# Patient Record
Sex: FEMALE
[De-identification: names, ages, dates, MRNs, and addresses within clinical notes are randomized per-mention and may not be internally consistent; named-entity substitution may affect disease eponyms.]

---

## 2020-08-26 ENCOUNTER — HOSPITAL ENCOUNTER (OUTPATIENT)
Dept: HOSPITAL 95 - LAB SHORT | Age: 70
Discharge: HOME | End: 2020-08-26
Attending: FAMILY MEDICINE
Payer: MEDICARE

## 2020-08-26 DIAGNOSIS — R42: Primary | ICD-10-CM

## 2020-08-26 LAB
ANION GAP SERPL CALCULATED.4IONS-SCNC: 13 MMOL/L (ref 6–16)
BASOPHILS # BLD AUTO: 0.02 K/MM3 (ref 0–0.23)
BASOPHILS NFR BLD AUTO: 0 % (ref 0–2)
BUN SERPL-MCNC: 25 MG/DL (ref 8–24)
CALCIUM SERPL-MCNC: 10.1 MG/DL (ref 8.5–10.1)
CHLORIDE SERPL-SCNC: 98 MMOL/L (ref 98–108)
CO2 SERPL-SCNC: 27 MMOL/L (ref 21–32)
CREAT SERPL-MCNC: 0.96 MG/DL (ref 0.4–1)
DEPRECATED RDW RBC AUTO: 46.5 FL (ref 35.1–46.3)
EOSINOPHIL # BLD AUTO: 0.02 K/MM3 (ref 0–0.68)
EOSINOPHIL NFR BLD AUTO: 0 % (ref 0–6)
ERYTHROCYTE [DISTWIDTH] IN BLOOD BY AUTOMATED COUNT: 12.4 % (ref 11.7–14.2)
GLUCOSE SERPL-MCNC: 119 MG/DL (ref 70–99)
HCT VFR BLD AUTO: 41 % (ref 33–51)
HGB BLD-MCNC: 14.4 G/DL (ref 11.5–16)
IMM GRANULOCYTES # BLD AUTO: 0.02 K/MM3 (ref 0–0.1)
IMM GRANULOCYTES NFR BLD AUTO: 0 % (ref 0–1)
LYMPHOCYTES # BLD AUTO: 0.72 K/MM3 (ref 0.84–5.2)
LYMPHOCYTES NFR BLD AUTO: 7 % (ref 21–46)
MCHC RBC AUTO-ENTMCNC: 35.1 G/DL (ref 31.5–36.5)
MCV RBC AUTO: 102 FL (ref 80–100)
MONOCYTES # BLD AUTO: 0.8 K/MM3 (ref 0.16–1.47)
MONOCYTES NFR BLD AUTO: 8 % (ref 4–13)
NEUTROPHILS # BLD AUTO: 8.23 K/MM3 (ref 1.96–9.15)
NEUTROPHILS NFR BLD AUTO: 84 % (ref 41–73)
NRBC # BLD AUTO: 0 K/MM3 (ref 0–0.02)
NRBC BLD AUTO-RTO: 0 /100 WBC (ref 0–0.2)
PLATELET # BLD AUTO: 211 K/MM3 (ref 150–400)
POTASSIUM SERPL-SCNC: 3.1 MMOL/L (ref 3.5–5.5)
SODIUM SERPL-SCNC: 138 MMOL/L (ref 136–145)

## 2020-09-12 ENCOUNTER — HOSPITAL ENCOUNTER (INPATIENT)
Dept: HOSPITAL 95 - ER | Age: 70
LOS: 2 days | Discharge: HOME | DRG: 439 | End: 2020-09-14
Attending: INTERNAL MEDICINE | Admitting: HOSPITALIST
Payer: MEDICARE

## 2020-09-12 VITALS — BODY MASS INDEX: 28.23 KG/M2 | HEIGHT: 64.02 IN | WEIGHT: 165.35 LBS

## 2020-09-12 DIAGNOSIS — E87.1: ICD-10-CM

## 2020-09-12 DIAGNOSIS — F32.9: ICD-10-CM

## 2020-09-12 DIAGNOSIS — K21.9: ICD-10-CM

## 2020-09-12 DIAGNOSIS — K59.00: ICD-10-CM

## 2020-09-12 DIAGNOSIS — E03.9: ICD-10-CM

## 2020-09-12 DIAGNOSIS — K85.90: Primary | ICD-10-CM

## 2020-09-12 DIAGNOSIS — E87.6: ICD-10-CM

## 2020-09-12 DIAGNOSIS — I10: ICD-10-CM

## 2020-09-12 DIAGNOSIS — Z79.82: ICD-10-CM

## 2020-09-12 DIAGNOSIS — R65.10: ICD-10-CM

## 2020-09-12 DIAGNOSIS — K86.9: ICD-10-CM

## 2020-09-12 LAB
SP GR SPEC: 1.01 (ref 1–1.02)
UROBILINOGEN UR STRIP-MCNC: (no result) MG/DL

## 2020-09-12 PROCEDURE — A9579 GAD-BASE MR CONTRAST NOS,1ML: HCPCS

## 2020-09-12 PROCEDURE — A9270 NON-COVERED ITEM OR SERVICE: HCPCS

## 2020-09-13 LAB
ALBUMIN SERPL BCP-MCNC: 3.5 G/DL (ref 3.4–5)
ANION GAP SERPL CALCULATED.4IONS-SCNC: 4 MMOL/L (ref 6–16)
BUN SERPL-MCNC: 16 MG/DL (ref 8–24)
CALCIUM SERPL-MCNC: 9.3 MG/DL (ref 8.5–10.1)
CHLORIDE SERPL-SCNC: 108 MMOL/L (ref 98–108)
CO2 SERPL-SCNC: 29 MMOL/L (ref 21–32)
CREAT SERPL-MCNC: 0.79 MG/DL (ref 0.4–1)
GLUCOSE SERPL-MCNC: 99 MG/DL (ref 70–99)
PHOSPHATE SERPL-MCNC: 2.8 MG/DL (ref 2.5–4.9)
POTASSIUM SERPL-SCNC: 3.4 MMOL/L (ref 3.5–5.5)
SODIUM SERPL-SCNC: 141 MMOL/L (ref 136–145)

## 2020-09-13 NOTE — NUR
SHIFT SUMMARY:
ADMITTED TO MEDICAL FLOOR FROM ER [facility]. VSS. AFEB. AAOX4. ABLE TO
COMMUNICATE NEEDS. STATES ABD PAIN IS WELL CONTROLLED AT THIS TIME. ABD SOFT,
MILDLY DISTENDED, NON-TENDER W/PALPATION. PT REPORTS NO BM X 1 WEEK. DENIES
FLATUS. NO N/V. UP INDEPENDENTLY IN ROOM. WILL CONT TO MONITOR.

## 2020-09-13 NOTE — NUR
PT IS A/OX3, PLEASANT AND COOPERATIVE, THE PT IS UP IND IN HER ROOM, THE PT
HAS DENIED N/V T/O THE DAY, THE PT WAS MEDICATED FOR HA PAIN X1 TODAY, PT
DENIED ABD PAIN, THE PT HAS TOLERATED CLEAR LIQ DIET, THE PT REPORTS DIARRHEA
AT THIS TIME, PT APPEARS TO BE BREATHING EASILY ON RA, CALL LIGHT IN REACH, NO
OTHER CHANGES NOTICED THIS SHIFT

## 2020-09-14 LAB
ALBUMIN SERPL BCP-MCNC: 3.2 G/DL (ref 3.4–5)
ALBUMIN/GLOB SERPL: 1.1 {RATIO} (ref 0.8–1.8)
ALT SERPL W P-5'-P-CCNC: 38 U/L (ref 12–78)
ANION GAP SERPL CALCULATED.4IONS-SCNC: 5 MMOL/L (ref 6–16)
AST SERPL W P-5'-P-CCNC: 21 U/L (ref 12–37)
BASOPHILS # BLD AUTO: 0.02 K/MM3 (ref 0–0.23)
BASOPHILS NFR BLD AUTO: 0 % (ref 0–2)
BILIRUB SERPL-MCNC: 0.3 MG/DL (ref 0.1–1)
BUN SERPL-MCNC: 9 MG/DL (ref 8–24)
CALCIUM SERPL-MCNC: 9 MG/DL (ref 8.5–10.1)
CHLORIDE SERPL-SCNC: 114 MMOL/L (ref 98–108)
CO2 SERPL-SCNC: 25 MMOL/L (ref 21–32)
CREAT SERPL-MCNC: 0.59 MG/DL (ref 0.4–1)
DEPRECATED RDW RBC AUTO: 47.9 FL (ref 35.1–46.3)
EOSINOPHIL # BLD AUTO: 0.1 K/MM3 (ref 0–0.68)
EOSINOPHIL NFR BLD AUTO: 2 % (ref 0–6)
ERYTHROCYTE [DISTWIDTH] IN BLOOD BY AUTOMATED COUNT: 12.3 % (ref 11.7–14.2)
GLOBULIN SER CALC-MCNC: 2.9 G/DL (ref 2.2–4)
GLUCOSE SERPL-MCNC: 103 MG/DL (ref 70–99)
HCT VFR BLD AUTO: 34.6 % (ref 33–51)
HGB BLD-MCNC: 11.5 G/DL (ref 11.5–16)
IMM GRANULOCYTES # BLD AUTO: 0.01 K/MM3 (ref 0–0.1)
IMM GRANULOCYTES NFR BLD AUTO: 0 % (ref 0–1)
LYMPHOCYTES # BLD AUTO: 1.03 K/MM3 (ref 0.84–5.2)
LYMPHOCYTES NFR BLD AUTO: 20 % (ref 21–46)
MCHC RBC AUTO-ENTMCNC: 33.2 G/DL (ref 31.5–36.5)
MCV RBC AUTO: 106 FL (ref 80–100)
MONOCYTES # BLD AUTO: 0.46 K/MM3 (ref 0.16–1.47)
MONOCYTES NFR BLD AUTO: 9 % (ref 4–13)
NEUTROPHILS # BLD AUTO: 3.45 K/MM3 (ref 1.96–9.15)
NEUTROPHILS NFR BLD AUTO: 68 % (ref 41–73)
NRBC # BLD AUTO: 0 K/MM3 (ref 0–0.02)
NRBC BLD AUTO-RTO: 0 /100 WBC (ref 0–0.2)
PLATELET # BLD AUTO: 172 K/MM3 (ref 150–400)
POTASSIUM SERPL-SCNC: 3.5 MMOL/L (ref 3.5–5.5)
PROT SERPL-MCNC: 6.1 G/DL (ref 6.4–8.2)
SODIUM SERPL-SCNC: 144 MMOL/L (ref 136–145)

## 2020-09-14 NOTE — NUR
NIGHT SHIFT SUMMARY
Patient states much more comfortable overnight.  Took Tylenol in AM for
headache, however no abd pain overnight.  OOB several times overnight to
void in toilet.  Patient is drinking well and hoping to be able to go home
today. multiple stools yesterday afternoon after laxatives given in morning.

## 2020-09-14 NOTE — NUR
SHIFT SUMMARY.
1535 PT DISCHARGED HOME VIA PERSONAL VEHICLE DRIVEN BY . ESCORTED TO
ENTRANCE VIA W/C BY VOLUNTEER. IV REMOVED. D/C INSTRUCTIONS REVIEWED WITH PT
AND COPY PROVIDED. NO NEW RX. NO NEW CHANGES OR CONCERNS.

## 2021-03-24 ENCOUNTER — HOSPITAL ENCOUNTER (OUTPATIENT)
Dept: HOSPITAL 95 - LAB | Age: 71
Discharge: HOME | End: 2021-03-24
Attending: INTERNAL MEDICINE
Payer: MEDICARE

## 2021-03-24 DIAGNOSIS — C25.0: Primary | ICD-10-CM

## 2021-03-24 LAB
ALBUMIN SERPL BCP-MCNC: 3.7 G/DL (ref 3.4–5)
ALBUMIN/GLOB SERPL: 1.3 {RATIO} (ref 0.8–1.8)
ALT SERPL W P-5'-P-CCNC: 17 U/L (ref 12–78)
ANION GAP SERPL CALCULATED.4IONS-SCNC: 8 MMOL/L (ref 6–16)
AST SERPL W P-5'-P-CCNC: 16 U/L (ref 12–37)
BASOPHILS # BLD AUTO: 0.05 K/MM3 (ref 0–0.23)
BASOPHILS NFR BLD AUTO: 1 % (ref 0–2)
BILIRUB SERPL-MCNC: 0.6 MG/DL (ref 0.1–1)
BUN SERPL-MCNC: 22 MG/DL (ref 8–24)
CALCIUM SERPL-MCNC: 9.1 MG/DL (ref 8.5–10.1)
CHLORIDE SERPL-SCNC: 105 MMOL/L (ref 98–108)
CO2 SERPL-SCNC: 25 MMOL/L (ref 21–32)
CREAT SERPL-MCNC: 0.59 MG/DL (ref 0.4–1)
DEPRECATED RDW RBC AUTO: 73.3 FL (ref 35.1–46.3)
EOSINOPHIL # BLD AUTO: 0.11 K/MM3 (ref 0–0.68)
EOSINOPHIL NFR BLD AUTO: 2 % (ref 0–6)
ERYTHROCYTE [DISTWIDTH] IN BLOOD BY AUTOMATED COUNT: 19 % (ref 11.7–14.2)
GLOBULIN SER CALC-MCNC: 2.8 G/DL (ref 2.2–4)
GLUCOSE SERPL-MCNC: 251 MG/DL (ref 70–99)
HCT VFR BLD AUTO: 33.4 % (ref 33–51)
HGB BLD-MCNC: 11.3 G/DL (ref 11.5–16)
IMM GRANULOCYTES # BLD AUTO: 0.02 K/MM3 (ref 0–0.1)
IMM GRANULOCYTES NFR BLD AUTO: 0 % (ref 0–1)
LYMPHOCYTES # BLD AUTO: 1.37 K/MM3 (ref 0.84–5.2)
LYMPHOCYTES NFR BLD AUTO: 24 % (ref 21–46)
MCHC RBC AUTO-ENTMCNC: 33.8 G/DL (ref 31.5–36.5)
MCV RBC AUTO: 105 FL (ref 80–100)
MONOCYTES # BLD AUTO: 0.91 K/MM3 (ref 0.16–1.47)
MONOCYTES NFR BLD AUTO: 16 % (ref 4–13)
NEUTROPHILS # BLD AUTO: 3.16 K/MM3 (ref 1.96–9.15)
NEUTROPHILS NFR BLD AUTO: 56 % (ref 41–73)
NRBC # BLD AUTO: 0 K/MM3 (ref 0–0.02)
NRBC BLD AUTO-RTO: 0 /100 WBC (ref 0–0.2)
PLATELET # BLD AUTO: 218 K/MM3 (ref 150–400)
POTASSIUM SERPL-SCNC: 4.3 MMOL/L (ref 3.5–5.5)
PROT SERPL-MCNC: 6.5 G/DL (ref 6.4–8.2)
SODIUM SERPL-SCNC: 138 MMOL/L (ref 136–145)

## 2021-08-28 ENCOUNTER — HOSPITAL ENCOUNTER (EMERGENCY)
Dept: HOSPITAL 95 - ER | Age: 71
Discharge: HOME | End: 2021-08-28
Payer: MEDICARE

## 2021-08-28 VITALS — WEIGHT: 141.01 LBS | BODY MASS INDEX: 24.07 KG/M2 | HEIGHT: 64 IN

## 2021-08-28 DIAGNOSIS — Z79.4: ICD-10-CM

## 2021-08-28 DIAGNOSIS — Z79.899: ICD-10-CM

## 2021-08-28 DIAGNOSIS — M10.9: Primary | ICD-10-CM

## 2021-08-28 DIAGNOSIS — Z88.5: ICD-10-CM

## 2021-08-28 DIAGNOSIS — I50.9: ICD-10-CM

## 2021-08-28 DIAGNOSIS — Z79.82: ICD-10-CM

## 2021-08-28 DIAGNOSIS — I11.0: ICD-10-CM

## 2021-08-28 DIAGNOSIS — E11.9: ICD-10-CM

## 2021-08-28 DIAGNOSIS — Z79.890: ICD-10-CM

## 2021-08-28 PROCEDURE — A9270 NON-COVERED ITEM OR SERVICE: HCPCS

## 2021-12-06 ENCOUNTER — HOSPITAL ENCOUNTER (OUTPATIENT)
Dept: HOSPITAL 95 - ORSCMMR | Age: 71
Discharge: HOME | End: 2021-12-06
Attending: STUDENT IN AN ORGANIZED HEALTH CARE EDUCATION/TRAINING PROGRAM
Payer: MEDICARE

## 2021-12-06 VITALS — BODY MASS INDEX: 22.21 KG/M2 | HEIGHT: 64 IN | WEIGHT: 130.07 LBS

## 2021-12-06 DIAGNOSIS — Z79.899: ICD-10-CM

## 2021-12-06 DIAGNOSIS — C25.9: Primary | ICD-10-CM

## 2021-12-06 DIAGNOSIS — K21.9: ICD-10-CM

## 2021-12-06 DIAGNOSIS — E11.9: ICD-10-CM

## 2021-12-06 DIAGNOSIS — I10: ICD-10-CM

## 2021-12-06 DIAGNOSIS — N18.30: ICD-10-CM

## 2021-12-06 PROCEDURE — C1788 PORT, INDWELLING, IMP: HCPCS

## 2022-06-07 ENCOUNTER — HOSPITAL ENCOUNTER (EMERGENCY)
Dept: HOSPITAL 95 - ER | Age: 72
Discharge: HOME | End: 2022-06-07
Payer: MEDICARE

## 2022-06-07 VITALS — BODY MASS INDEX: 20.49 KG/M2 | WEIGHT: 120 LBS | HEIGHT: 64 IN

## 2022-06-07 DIAGNOSIS — E11.649: Primary | ICD-10-CM

## 2022-06-07 DIAGNOSIS — I50.9: ICD-10-CM

## 2022-06-07 DIAGNOSIS — Z91.81: ICD-10-CM

## 2022-06-07 DIAGNOSIS — Z79.82: ICD-10-CM

## 2022-06-07 DIAGNOSIS — E03.9: ICD-10-CM

## 2022-06-07 DIAGNOSIS — Z79.899: ICD-10-CM

## 2022-06-07 DIAGNOSIS — I11.0: ICD-10-CM

## 2022-06-07 DIAGNOSIS — R29.6: ICD-10-CM

## 2022-06-07 DIAGNOSIS — Z79.4: ICD-10-CM

## 2022-06-07 LAB
ALBUMIN SERPL BCP-MCNC: 3 G/DL (ref 3.4–5)
ALBUMIN/GLOB SERPL: 0.9 {RATIO} (ref 0.8–1.8)
ALT SERPL W P-5'-P-CCNC: 33 U/L (ref 12–78)
ANION GAP SERPL CALCULATED.4IONS-SCNC: 8 MMOL/L (ref 6–16)
AST SERPL W P-5'-P-CCNC: 30 U/L (ref 12–37)
BASOPHILS # BLD AUTO: 0.03 K/MM3 (ref 0–0.23)
BASOPHILS NFR BLD AUTO: 1 % (ref 0–2)
BILIRUB SERPL-MCNC: 0.4 MG/DL (ref 0.1–1)
BUN SERPL-MCNC: 18 MG/DL (ref 8–24)
CALCIUM SERPL-MCNC: 9.5 MG/DL (ref 8.5–10.1)
CHLORIDE SERPL-SCNC: 104 MMOL/L (ref 98–108)
CO2 SERPL-SCNC: 27 MMOL/L (ref 21–32)
CREAT SERPL-MCNC: 0.52 MG/DL (ref 0.4–1)
DEPRECATED RDW RBC AUTO: 68.3 FL (ref 35.1–46.3)
EOSINOPHIL # BLD AUTO: 0.04 K/MM3 (ref 0–0.68)
EOSINOPHIL NFR BLD AUTO: 1 % (ref 0–6)
ERYTHROCYTE [DISTWIDTH] IN BLOOD BY AUTOMATED COUNT: 17.2 % (ref 11.7–14.2)
GLOBULIN SER CALC-MCNC: 3.2 G/DL (ref 2.2–4)
GLUCOSE SERPL-MCNC: 152 MG/DL (ref 70–99)
HCT VFR BLD AUTO: 34.2 % (ref 33–51)
HGB BLD-MCNC: 11.4 G/DL (ref 11.5–16)
IMM GRANULOCYTES # BLD AUTO: 0.01 K/MM3 (ref 0–0.1)
IMM GRANULOCYTES NFR BLD AUTO: 0 % (ref 0–1)
LYMPHOCYTES # BLD AUTO: 0.32 K/MM3 (ref 0.84–5.2)
LYMPHOCYTES NFR BLD AUTO: 7 % (ref 21–46)
MCHC RBC AUTO-ENTMCNC: 33.3 G/DL (ref 31.5–36.5)
MCV RBC AUTO: 108 FL (ref 80–100)
MONOCYTES # BLD AUTO: 0.58 K/MM3 (ref 0.16–1.47)
MONOCYTES NFR BLD AUTO: 12 % (ref 4–13)
NEUTROPHILS # BLD AUTO: 3.97 K/MM3 (ref 1.96–9.15)
NEUTROPHILS NFR BLD AUTO: 80 % (ref 41–73)
NRBC # BLD AUTO: 0 K/MM3 (ref 0–0.02)
NRBC BLD AUTO-RTO: 0 /100 WBC (ref 0–0.2)
PLATELET # BLD AUTO: 290 K/MM3 (ref 150–400)
POTASSIUM SERPL-SCNC: 4.2 MMOL/L (ref 3.5–5.5)
PROT SERPL-MCNC: 6.2 G/DL (ref 6.4–8.2)
SODIUM SERPL-SCNC: 139 MMOL/L (ref 136–145)

## 2022-07-11 ENCOUNTER — HOSPITAL ENCOUNTER (OUTPATIENT)
Dept: HOSPITAL 95 - ORSCSDS | Age: 72
Discharge: HOME | End: 2022-07-11
Attending: STUDENT IN AN ORGANIZED HEALTH CARE EDUCATION/TRAINING PROGRAM
Payer: MEDICARE

## 2022-07-11 VITALS — HEIGHT: 64 IN | BODY MASS INDEX: 18.18 KG/M2 | WEIGHT: 106.48 LBS

## 2022-07-11 DIAGNOSIS — E78.2: ICD-10-CM

## 2022-07-11 DIAGNOSIS — K29.70: ICD-10-CM

## 2022-07-11 DIAGNOSIS — Z79.899: ICD-10-CM

## 2022-07-11 DIAGNOSIS — C25.8: Primary | ICD-10-CM

## 2022-07-11 DIAGNOSIS — E10.8: ICD-10-CM

## 2022-07-11 DIAGNOSIS — E03.9: ICD-10-CM

## 2022-07-11 DIAGNOSIS — I10: ICD-10-CM

## 2022-07-11 DIAGNOSIS — R19.7: ICD-10-CM

## 2022-07-11 DIAGNOSIS — R11.2: ICD-10-CM

## 2022-07-11 DIAGNOSIS — F32.A: ICD-10-CM

## 2022-07-11 PROCEDURE — 0DB68ZX EXCISION OF STOMACH, VIA NATURAL OR ARTIFICIAL OPENING ENDOSCOPIC, DIAGNOSTIC: ICD-10-PCS | Performed by: STUDENT IN AN ORGANIZED HEALTH CARE EDUCATION/TRAINING PROGRAM

## 2022-07-11 PROCEDURE — 0DBA8ZX EXCISION OF JEJUNUM, VIA NATURAL OR ARTIFICIAL OPENING ENDOSCOPIC, DIAGNOSTIC: ICD-10-PCS | Performed by: STUDENT IN AN ORGANIZED HEALTH CARE EDUCATION/TRAINING PROGRAM

## 2022-07-11 NOTE — NUR
07/11/22 1127 Irma Conklin
PT. VERBALIZES FEELING BETTER THAN WHEN SHE CAME IN. PT. COULD TELL
THAT HER BLOOD SUGAR WAS UP.

## 2022-07-26 ENCOUNTER — HOSPITAL ENCOUNTER (OUTPATIENT)
Dept: HOSPITAL 95 - LAB SHORT | Age: 72
Discharge: HOME | End: 2022-07-26
Attending: FAMILY MEDICINE
Payer: MEDICARE

## 2022-07-26 DIAGNOSIS — C25.0: Primary | ICD-10-CM

## 2022-07-26 DIAGNOSIS — R11.2: ICD-10-CM

## 2022-07-26 LAB
ANION GAP SERPL CALCULATED.4IONS-SCNC: 7 MMOL/L (ref 6–16)
BASOPHILS # BLD AUTO: 0.02 K/MM3 (ref 0–0.23)
BASOPHILS NFR BLD AUTO: 0 % (ref 0–2)
BUN SERPL-MCNC: 12 MG/DL (ref 8–24)
CALCIUM SERPL-MCNC: 8.6 MG/DL (ref 8.5–10.1)
CHLORIDE SERPL-SCNC: 102 MMOL/L (ref 98–108)
CO2 SERPL-SCNC: 29 MMOL/L (ref 21–32)
CREAT SERPL-MCNC: 0.92 MG/DL (ref 0.4–1)
DEPRECATED RDW RBC AUTO: 53.2 FL (ref 35.1–46.3)
EOSINOPHIL # BLD AUTO: 0.02 K/MM3 (ref 0–0.68)
EOSINOPHIL NFR BLD AUTO: 0 % (ref 0–6)
ERYTHROCYTE [DISTWIDTH] IN BLOOD BY AUTOMATED COUNT: 14.3 % (ref 11.7–14.2)
GLUCOSE SERPL-MCNC: 106 MG/DL (ref 70–99)
HCT VFR BLD AUTO: 37.2 % (ref 33–51)
HGB BLD-MCNC: 13 G/DL (ref 11.5–16)
IMM GRANULOCYTES # BLD AUTO: 0.02 K/MM3 (ref 0–0.1)
IMM GRANULOCYTES NFR BLD AUTO: 0 % (ref 0–1)
LYMPHOCYTES # BLD AUTO: 0.32 K/MM3 (ref 0.84–5.2)
LYMPHOCYTES NFR BLD AUTO: 5 % (ref 21–46)
MCHC RBC AUTO-ENTMCNC: 34.9 G/DL (ref 31.5–36.5)
MCV RBC AUTO: 102 FL (ref 80–100)
MONOCYTES # BLD AUTO: 0.68 K/MM3 (ref 0.16–1.47)
MONOCYTES NFR BLD AUTO: 11 % (ref 4–13)
NEUTROPHILS # BLD AUTO: 5.3 K/MM3 (ref 1.96–9.15)
NEUTROPHILS NFR BLD AUTO: 83 % (ref 41–73)
NRBC # BLD AUTO: 0 K/MM3 (ref 0–0.02)
NRBC BLD AUTO-RTO: 0 /100 WBC (ref 0–0.2)
PLATELET # BLD AUTO: 227 K/MM3 (ref 150–400)
POTASSIUM SERPL-SCNC: 4.1 MMOL/L (ref 3.5–5.5)
SODIUM SERPL-SCNC: 138 MMOL/L (ref 136–145)

## 2022-07-28 LAB
ALBUMIN SERPL BCP-MCNC: 2.6 G/DL (ref 3.4–5)
ALBUMIN/GLOB SERPL: 0.9 {RATIO} (ref 0.8–1.8)
ALT SERPL W P-5'-P-CCNC: 121 U/L (ref 12–78)
ANION GAP SERPL CALCULATED.4IONS-SCNC: 8 MMOL/L (ref 6–16)
AST SERPL W P-5'-P-CCNC: 176 U/L (ref 12–37)
BILIRUB SERPL-MCNC: 0.8 MG/DL (ref 0.1–1)
BUN SERPL-MCNC: 12 MG/DL (ref 8–24)
CALCIUM SERPL-MCNC: 8.5 MG/DL (ref 8.5–10.1)
CHLORIDE SERPL-SCNC: 102 MMOL/L (ref 98–108)
CO2 SERPL-SCNC: 27 MMOL/L (ref 21–32)
CREAT SERPL-MCNC: 0.9 MG/DL (ref 0.4–1)
GLOBULIN SER CALC-MCNC: 2.8 G/DL (ref 2.2–4)
GLUCOSE SERPL-MCNC: 101 MG/DL (ref 70–99)
POTASSIUM SERPL-SCNC: 4.6 MMOL/L (ref 3.5–5.5)
PROT SERPL-MCNC: 5.4 G/DL (ref 6.4–8.2)
SODIUM SERPL-SCNC: 137 MMOL/L (ref 136–145)

## 2022-08-27 ENCOUNTER — HOSPITAL ENCOUNTER (INPATIENT)
Dept: HOSPITAL 95 - ER | Age: 72
LOS: 14 days | Discharge: HOME HEALTH | DRG: 871 | End: 2022-09-10
Attending: STUDENT IN AN ORGANIZED HEALTH CARE EDUCATION/TRAINING PROGRAM | Admitting: FAMILY MEDICINE
Payer: MEDICARE

## 2022-08-27 VITALS — HEIGHT: 64 IN | WEIGHT: 122.36 LBS | BODY MASS INDEX: 20.89 KG/M2

## 2022-08-27 DIAGNOSIS — Z90.49: ICD-10-CM

## 2022-08-27 DIAGNOSIS — N20.1: ICD-10-CM

## 2022-08-27 DIAGNOSIS — Z88.5: ICD-10-CM

## 2022-08-27 DIAGNOSIS — E13.22: ICD-10-CM

## 2022-08-27 DIAGNOSIS — K64.4: ICD-10-CM

## 2022-08-27 DIAGNOSIS — G43.909: ICD-10-CM

## 2022-08-27 DIAGNOSIS — E88.09: ICD-10-CM

## 2022-08-27 DIAGNOSIS — I50.9: ICD-10-CM

## 2022-08-27 DIAGNOSIS — Z51.5: ICD-10-CM

## 2022-08-27 DIAGNOSIS — Z79.4: ICD-10-CM

## 2022-08-27 DIAGNOSIS — I13.0: ICD-10-CM

## 2022-08-27 DIAGNOSIS — E89.1: ICD-10-CM

## 2022-08-27 DIAGNOSIS — E13.10: ICD-10-CM

## 2022-08-27 DIAGNOSIS — I81: ICD-10-CM

## 2022-08-27 DIAGNOSIS — Z66: ICD-10-CM

## 2022-08-27 DIAGNOSIS — N39.0: ICD-10-CM

## 2022-08-27 DIAGNOSIS — R31.9: ICD-10-CM

## 2022-08-27 DIAGNOSIS — K92.1: ICD-10-CM

## 2022-08-27 DIAGNOSIS — K21.9: ICD-10-CM

## 2022-08-27 DIAGNOSIS — E87.6: ICD-10-CM

## 2022-08-27 DIAGNOSIS — E87.0: ICD-10-CM

## 2022-08-27 DIAGNOSIS — C25.9: ICD-10-CM

## 2022-08-27 DIAGNOSIS — Z20.822: ICD-10-CM

## 2022-08-27 DIAGNOSIS — E83.42: ICD-10-CM

## 2022-08-27 DIAGNOSIS — Z88.8: ICD-10-CM

## 2022-08-27 DIAGNOSIS — Z98.890: ICD-10-CM

## 2022-08-27 DIAGNOSIS — E03.9: ICD-10-CM

## 2022-08-27 DIAGNOSIS — K63.5: ICD-10-CM

## 2022-08-27 DIAGNOSIS — E83.39: ICD-10-CM

## 2022-08-27 DIAGNOSIS — K31.89: ICD-10-CM

## 2022-08-27 DIAGNOSIS — K76.6: ICD-10-CM

## 2022-08-27 DIAGNOSIS — A41.9: Primary | ICD-10-CM

## 2022-08-27 DIAGNOSIS — N18.30: ICD-10-CM

## 2022-08-27 DIAGNOSIS — E53.8: ICD-10-CM

## 2022-08-27 DIAGNOSIS — Z90.89: ICD-10-CM

## 2022-08-27 DIAGNOSIS — Z90.710: ICD-10-CM

## 2022-08-27 DIAGNOSIS — Z90.410: ICD-10-CM

## 2022-08-27 DIAGNOSIS — D63.1: ICD-10-CM

## 2022-08-27 DIAGNOSIS — R65.20: ICD-10-CM

## 2022-08-27 DIAGNOSIS — N17.9: ICD-10-CM

## 2022-08-27 DIAGNOSIS — G92.8: ICD-10-CM

## 2022-08-27 DIAGNOSIS — T45.1X5A: ICD-10-CM

## 2022-08-27 DIAGNOSIS — F32.A: ICD-10-CM

## 2022-08-27 DIAGNOSIS — Z79.899: ICD-10-CM

## 2022-08-27 LAB
ALBUMIN SERPL BCP-MCNC: 2.4 G/DL (ref 3.4–5)
ALBUMIN/GLOB SERPL: 0.9 {RATIO} (ref 0.8–1.8)
ALT SERPL W P-5'-P-CCNC: 28 U/L (ref 12–78)
ANION GAP SERPL CALCULATED.4IONS-SCNC: 14 MMOL/L (ref 6–16)
AST SERPL W P-5'-P-CCNC: 33 U/L (ref 12–37)
BASOPHILS # BLD AUTO: 0.09 K/MM3 (ref 0–0.23)
BASOPHILS NFR BLD AUTO: 0 % (ref 0–2)
BILIRUB SERPL-MCNC: 1.5 MG/DL (ref 0.1–1)
BILIRUB UR QL STRIP: (no result)
BUN SERPL-MCNC: 27 MG/DL (ref 8–24)
CALCIUM SERPL-MCNC: 9.2 MG/DL (ref 8.5–10.1)
CHLORIDE SERPL-SCNC: 114 MMOL/L (ref 98–108)
CO2 SERPL-SCNC: 12 MMOL/L (ref 21–32)
CREAT SERPL-MCNC: 0.85 MG/DL (ref 0.4–1)
DEPRECATED RDW RBC AUTO: 71.5 FL (ref 35.1–46.3)
EOSINOPHIL # BLD AUTO: 0.01 K/MM3 (ref 0–0.68)
EOSINOPHIL NFR BLD AUTO: 0 % (ref 0–6)
ERYTHROCYTE [DISTWIDTH] IN BLOOD BY AUTOMATED COUNT: 21.6 % (ref 11.7–14.2)
GLOBULIN SER CALC-MCNC: 2.8 G/DL (ref 2.2–4)
GLUCOSE SERPL-MCNC: 246 MG/DL (ref 70–99)
HCT VFR BLD AUTO: 26.7 % (ref 33–51)
HGB BLD-MCNC: 9.4 G/DL (ref 11.5–16)
IMM GRANULOCYTES # BLD AUTO: 1.38 K/MM3 (ref 0–0.1)
IMM GRANULOCYTES NFR BLD AUTO: 2 % (ref 0–1)
LEUKOCYTE ESTERASE UR QL STRIP: (no result)
LYMPHOCYTES # BLD AUTO: 0.48 K/MM3 (ref 0.84–5.2)
LYMPHOCYTES NFR BLD AUTO: 1 % (ref 21–46)
MCHC RBC AUTO-ENTMCNC: 35.2 G/DL (ref 31.5–36.5)
MCV RBC AUTO: 101 FL (ref 80–100)
MONOCYTES # BLD AUTO: 0.57 K/MM3 (ref 0.16–1.47)
MONOCYTES NFR BLD AUTO: 1 % (ref 4–13)
NEUTROPHILS # BLD AUTO: 69.63 K/MM3 (ref 1.96–9.15)
NEUTROPHILS NFR BLD AUTO: 97 % (ref 41–73)
NRBC # BLD AUTO: 0.03 K/MM3 (ref 0–0.02)
NRBC BLD AUTO-RTO: 0 /100 WBC (ref 0–0.2)
PH BLDV: 7.18 [PH] (ref 7.34–7.37)
PLATELET # BLD AUTO: 146 K/MM3 (ref 150–400)
POTASSIUM SERPL-SCNC: 3.7 MMOL/L (ref 3.5–5.5)
PROT SERPL-MCNC: 5.2 G/DL (ref 6.4–8.2)
PROT UR STRIP-MCNC: (no result) MG/DL
RBC #/AREA URNS HPF: (no result) /HPF (ref 0–2)
SODIUM SERPL-SCNC: 140 MMOL/L (ref 136–145)
SP GR SPEC: 1.01 (ref 1–1.02)
UROBILINOGEN UR STRIP-MCNC: (no result) MG/DL

## 2022-08-27 PROCEDURE — P9016 RBC LEUKOCYTES REDUCED: HCPCS

## 2022-08-27 PROCEDURE — P9612 CATHETERIZE FOR URINE SPEC: HCPCS

## 2022-08-27 PROCEDURE — C1751 CATH, INF, PER/CENT/MIDLINE: HCPCS

## 2022-08-27 PROCEDURE — P9035 PLATELET PHERES LEUKOREDUCED: HCPCS

## 2022-08-27 PROCEDURE — U0004 COV-19 TEST NON-CDC HGH THRU: HCPCS

## 2022-08-27 PROCEDURE — P9047 ALBUMIN (HUMAN), 25%, 50ML: HCPCS

## 2022-08-27 PROCEDURE — A9270 NON-COVERED ITEM OR SERVICE: HCPCS

## 2022-08-27 PROCEDURE — A9579 GAD-BASE MR CONTRAST NOS,1ML: HCPCS

## 2022-08-28 LAB
ALBUMIN SERPL BCP-MCNC: 2 G/DL (ref 3.4–5)
ALBUMIN/GLOB SERPL: 0.8 {RATIO} (ref 0.8–1.8)
ALT SERPL W P-5'-P-CCNC: 25 U/L (ref 12–78)
ANION GAP SERPL CALCULATED.4IONS-SCNC: 16 MMOL/L (ref 6–16)
AST SERPL W P-5'-P-CCNC: 27 U/L (ref 12–37)
BASOPHILS # BLD: 0 K/MM3 (ref 0–0.23)
BASOPHILS NFR BLD: 0 % (ref 0–2)
BILIRUB SERPL-MCNC: 1.5 MG/DL (ref 0.1–1)
BUN SERPL-MCNC: 30 MG/DL (ref 8–24)
CALCIUM SERPL-MCNC: 8.5 MG/DL (ref 8.5–10.1)
CHLORIDE SERPL-SCNC: 113 MMOL/L (ref 98–108)
CO2 SERPL-SCNC: 12 MMOL/L (ref 21–32)
CREAT SERPL-MCNC: 0.79 MG/DL (ref 0.4–1)
DEPRECATED RDW RBC AUTO: 74.4 FL (ref 35.1–46.3)
EOSINOPHIL # BLD: 0 K/MM3 (ref 0–0.68)
EOSINOPHIL NFR BLD: 0 % (ref 0–6)
ERYTHROCYTE [DISTWIDTH] IN BLOOD BY AUTOMATED COUNT: 21.7 % (ref 11.7–14.2)
GLOBULIN SER CALC-MCNC: 2.6 G/DL (ref 2.2–4)
GLUCOSE SERPL-MCNC: 260 MG/DL (ref 70–99)
HCT VFR BLD AUTO: 25.4 % (ref 33–51)
HGB BLD-MCNC: 8.9 G/DL (ref 11.5–16)
MCHC RBC AUTO-ENTMCNC: 35 G/DL (ref 31.5–36.5)
MCV RBC AUTO: 103 FL (ref 80–100)
METAMYELOCYTES # BLD MANUAL: 0.78 K/MM3 (ref 0–0)
METAMYELOCYTES NFR BLD MANUAL: 1 % (ref 0–0)
MONOCYTES # BLD: 0 K/MM3 (ref 0.16–1.47)
MONOCYTES NFR BLD: 0 % (ref 4–13)
NEUTS BAND NFR BLD MANUAL: 3 % (ref 0–8)
NEUTS SEG # BLD MANUAL: 77.95 K/MM3 (ref 1.96–9.15)
NEUTS SEG NFR BLD MANUAL: 96 % (ref 41–73)
NRBC # BLD AUTO: 0.04 K/MM3 (ref 0–0.02)
NRBC BLD AUTO-RTO: 0.1 /100 WBC (ref 0–0.2)
PLATELET # BLD AUTO: 141 K/MM3 (ref 150–400)
POTASSIUM SERPL-SCNC: 4 MMOL/L (ref 3.5–5.5)
PROT SERPL-MCNC: 4.6 G/DL (ref 6.4–8.2)
SODIUM SERPL-SCNC: 141 MMOL/L (ref 136–145)
TOTAL CELLS COUNTED BLD: 100

## 2022-08-28 PROCEDURE — 3E03329 INTRODUCTION OF OTHER ANTI-INFECTIVE INTO PERIPHERAL VEIN, PERCUTANEOUS APPROACH: ICD-10-PCS | Performed by: STUDENT IN AN ORGANIZED HEALTH CARE EDUCATION/TRAINING PROGRAM

## 2022-08-28 NOTE — NUR
SHIFT SUMMARY
RESPONDS TO VERBAL STIMULI. HAS DIFFICULTY STAYING AWAKE OR KEEPING EYES OPEN.
VERY LETHARGIC. VERY SLOW SPEECH, OCC 1 WORD RESPONSES, SOMETIMES STUTTERS OR
HAS REPETITIVE & GARBLED SPEECH. OCC TREMORS/SHAKING T/O UPPER EXT. UNABLE
TO ANSWER QUESTIONS APPROPRIATE OR FOLLOW DIRECTIONS. AOX1-SELF. CAN'T ANSWER
ANY OTHER ORIENTATION QUESTIONS CORRECTLY. PUPILS EQUAL. UNABLE TO  HANDS
BECAUSE SHE CANT FOLLOW DIRECTIONS & KEEPS STICKING OUT TONGUE WHEN ASKED TO
. VSS. NO S/SX PAIN OR DYSPNEA. DID HAVE 1 EPISODE DARK GREEN PROJECTILE
EMESIS, NONE AFTER REGLAN GIVEN. TRIED TRANSFERRING PT TO [facility], HOWEVER
PT UNABLE TO FOLLOW SIMPLE DIRECTIONS & WAS VERY UNCOORDINATED & UNSTEADY ON
FEET, THEREFORE HAS BEEN BEDREST SINCE. HAVE KEPT NPO FOR NOW SINCE PT UNSAFE
TO SWALLOW AT THIS TIME. BED ALARM & CALL LIGHT IN PLACE. WILL MONITOR.

## 2022-08-28 NOTE — NUR
Return visit to  in room per his request.  exhibiting anxiety,
CG fatigue/distress, being overwhelmed with wife's sudden decline, despite
years of poor health with cancer and treatment. He is verbalizing anticipatory
grieving for her EOL. Time spent listening, consoling, supporting. Librado
expressed needing more concrete information. I reviewed again what is known &
the current plan of care to support his wife. I helped him formulate a plan
for respite/rest for him and getting throught the next 24 hours. He has not
informed pt's mother in [place] and other family members of pt's admission
to the hospital and change in condition. He will work on that after he leaves
this afternoon. Explained the uncertainty of each person's progression of
illness and decline, that we can not predict absolute outcomes. He seemed less
anxious and distressed at the end of our visit. He has family in [place],
son, dil that he has kept updated and they are supportive from afar. He and
his wife have been  30+ years and in recent years have leaned on each
other thru their individual health issues, cancer dx etc. Spiritual care
referral made for visit tomorrow for support also.

## 2022-08-28 NOTE — NUR
NEW ADMIT
ADMITTED FOR METABOLIC ACIDOSIS, SEPSIS. PT ARRIVED TO  324 VIA STRETCHER
@0123. 3 PER SLIDE TRSANSFER TO NEW BED. PT LETHARGIC & UNABLE TO FOLLOW
DIRECTIONS WELL. ORIENTED TO RM & CALL LIGHT, PLACED BED ALARM ON FOR SAFETY.
WILL MONITOR.

## 2022-08-28 NOTE — NUR
SHIFT SUMMARY
PT AxOx0-1 (SELF). PT WAS DROWSY AND LETHARGIC FOR DURATION OF DAY SHIFT. PT
OPENS EYES TO VERBAL STIMULI, BUT DOES NOT ANSWER QUESTIONS APPROPRIATELY.
RESPONDS WITH OUT OF CONTEXT SHORT GARBLED SPEECH OR REPEATS THE SAME WORD 3-4
TIMES. PT WAS MEDICATED x1 FOR PAIN. PT APPEARED COMFORTABLE RESTING IN BED
OTHERWISE.  PT WAS UNABLE TO EAT OR SAFELY SWALLOW ANY FOOD THIS SHIFT.
PALLIATIVE CARE CONSULTED. , RAYRAY IN ROOM TODAY, SPOKE WITH DR AND
PALLIATIVE CARE NURSE ABOUT PLANS. PT CODE STATUS CHANGED TO DNR. PT IS
CURRENTLY RESTING IN BED. CALL LIGHT IN REACH. UNRESPONSIVE WHEN ASKED IF SHE
NEEDS ANYTHING
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
## 2022-08-28 NOTE — NUR
CRITICAL LAB/EMESIS
*LATE ENTRY*
AROUND 0130 INFORMED BY LAB OF CRITICAL LA 4.6, HAS TRENDED DOWN FROM PREVIOUS
5.5, INFORMED CHARGE LAUREN MCCORD
 
AROUND 0215 WHILE THIS NURSE ON BREAK, PT STARTED HAVING MOD AMOUNT PROJECTILE
DARK GREEN EMESIS, INFORMED BY OTHER LAUREN YEE ON FLOOR. CLOTHING & BEDDING
SATURATED IN BILE COLORED EMESIS. INFORMED DR BARRETO & HE ORDERED PRN
REGLAN FOR N/V, GAVE MEDICATION & WILL MONITOR.

## 2022-08-29 LAB
ALBUMIN SERPL BCP-MCNC: 1.9 G/DL (ref 3.4–5)
ALBUMIN SERPL BCP-MCNC: 2.1 G/DL (ref 3.4–5)
ALBUMIN/GLOB SERPL: 0.8 {RATIO} (ref 0.8–1.8)
ALBUMIN/GLOB SERPL: 0.9 {RATIO} (ref 0.8–1.8)
ALT SERPL W P-5'-P-CCNC: 23 U/L (ref 12–78)
ALT SERPL W P-5'-P-CCNC: 27 U/L (ref 12–78)
ANION GAP SERPL CALCULATED.4IONS-SCNC: 13 MMOL/L (ref 6–16)
ANION GAP SERPL CALCULATED.4IONS-SCNC: 14 MMOL/L (ref 6–16)
AST SERPL W P-5'-P-CCNC: 36 U/L (ref 12–37)
AST SERPL W P-5'-P-CCNC: 40 U/L (ref 12–37)
BASOPHILS # BLD: 0 K/MM3 (ref 0–0.23)
BASOPHILS # BLD: 0 K/MM3 (ref 0–0.23)
BASOPHILS NFR BLD: 0 % (ref 0–2)
BASOPHILS NFR BLD: 0 % (ref 0–2)
BILIRUB SERPL-MCNC: 1 MG/DL (ref 0.1–1)
BILIRUB SERPL-MCNC: 1.1 MG/DL (ref 0.1–1)
BUN SERPL-MCNC: 39 MG/DL (ref 8–24)
BUN SERPL-MCNC: 39 MG/DL (ref 8–24)
CALCIUM SERPL-MCNC: 8.7 MG/DL (ref 8.5–10.1)
CALCIUM SERPL-MCNC: 9.3 MG/DL (ref 8.5–10.1)
CHLORIDE SERPL-SCNC: 114 MMOL/L (ref 98–108)
CHLORIDE SERPL-SCNC: 115 MMOL/L (ref 98–108)
CO2 SERPL-SCNC: 13 MMOL/L (ref 21–32)
CO2 SERPL-SCNC: 14 MMOL/L (ref 21–32)
CREAT SERPL-MCNC: 0.86 MG/DL (ref 0.4–1)
CREAT SERPL-MCNC: 0.93 MG/DL (ref 0.4–1)
DEPRECATED RDW RBC AUTO: 67.6 FL (ref 35.1–46.3)
DEPRECATED RDW RBC AUTO: 69 FL (ref 35.1–46.3)
EOSINOPHIL # BLD: 0 K/MM3 (ref 0–0.68)
EOSINOPHIL # BLD: 0 K/MM3 (ref 0–0.68)
EOSINOPHIL NFR BLD: 0 % (ref 0–6)
EOSINOPHIL NFR BLD: 0 % (ref 0–6)
ERYTHROCYTE [DISTWIDTH] IN BLOOD BY AUTOMATED COUNT: 21.2 % (ref 11.7–14.2)
ERYTHROCYTE [DISTWIDTH] IN BLOOD BY AUTOMATED COUNT: 21.2 % (ref 11.7–14.2)
GLOBULIN SER CALC-MCNC: 2.4 G/DL (ref 2.2–4)
GLOBULIN SER CALC-MCNC: 2.4 G/DL (ref 2.2–4)
GLUCOSE SERPL-MCNC: 274 MG/DL (ref 70–99)
GLUCOSE SERPL-MCNC: 275 MG/DL (ref 70–99)
HCT VFR BLD AUTO: 22.6 % (ref 33–51)
HCT VFR BLD AUTO: 23.3 % (ref 33–51)
HGB BLD-MCNC: 8.1 G/DL (ref 11.5–16)
HGB BLD-MCNC: 8.3 G/DL (ref 11.5–16)
KETONES UR STRIP-MCNC: (no result) MG/DL
LYMPHOCYTES # BLD: 0.58 K/MM3 (ref 0.84–5.2)
LYMPHOCYTES NFR BLD: 1 % (ref 21–46)
MAGNESIUM SERPL-MCNC: 1.7 MG/DL (ref 1.6–2.4)
MAGNESIUM SERPL-MCNC: 1.7 MG/DL (ref 1.6–2.4)
MCHC RBC AUTO-ENTMCNC: 35.6 G/DL (ref 31.5–36.5)
MCHC RBC AUTO-ENTMCNC: 35.8 G/DL (ref 31.5–36.5)
MCV RBC AUTO: 99 FL (ref 80–100)
MCV RBC AUTO: 99 FL (ref 80–100)
METAMYELOCYTES # BLD MANUAL: 0.67 K/MM3 (ref 0–0)
METAMYELOCYTES NFR BLD MANUAL: 1 % (ref 0–0)
MONOCYTES # BLD: 0.58 K/MM3 (ref 0.16–1.47)
MONOCYTES # BLD: 0.67 K/MM3 (ref 0.16–1.47)
MONOCYTES NFR BLD: 1 % (ref 4–13)
MONOCYTES NFR BLD: 1 % (ref 4–13)
NEUTS BAND NFR BLD MANUAL: 1 % (ref 0–8)
NEUTS BAND NFR BLD MANUAL: 1 % (ref 0–8)
NEUTS SEG # BLD MANUAL: 57.43 K/MM3 (ref 1.96–9.15)
NEUTS SEG # BLD MANUAL: 65.99 K/MM3 (ref 1.96–9.15)
NEUTS SEG NFR BLD MANUAL: 97 % (ref 41–73)
NEUTS SEG NFR BLD MANUAL: 97 % (ref 41–73)
NRBC # BLD AUTO: 0.1 K/MM3 (ref 0–0.02)
NRBC # BLD AUTO: 0.21 K/MM3 (ref 0–0.02)
NRBC BLD AUTO-RTO: 0.1 /100 WBC (ref 0–0.2)
NRBC BLD AUTO-RTO: 0.4 /100 WBC (ref 0–0.2)
PH BLDA: 7.32 [PH] (ref 7.35–7.45)
PHOSPHATE SERPL-MCNC: 4.5 MG/DL (ref 2.5–4.9)
PHOSPHATE SERPL-MCNC: 4.7 MG/DL (ref 2.5–4.9)
PLATELET # BLD AUTO: 117 K/MM3 (ref 150–400)
PLATELET # BLD AUTO: 98 K/MM3 (ref 150–400)
POTASSIUM SERPL-SCNC: 3.6 MMOL/L (ref 3.5–5.5)
POTASSIUM SERPL-SCNC: 3.7 MMOL/L (ref 3.5–5.5)
PROT SERPL-MCNC: 4.3 G/DL (ref 6.4–8.2)
PROT SERPL-MCNC: 4.5 G/DL (ref 6.4–8.2)
PROT UR STRIP-MCNC: (no result) MG/DL
RBC #/AREA URNS HPF: (no result) /HPF (ref 0–2)
SODIUM SERPL-SCNC: 141 MMOL/L (ref 136–145)
SODIUM SERPL-SCNC: 142 MMOL/L (ref 136–145)
SP GR SPEC: 1.01 (ref 1–1.02)
TOTAL CELLS COUNTED BLD: 100
TOTAL CELLS COUNTED BLD: 100
URN SPEC COLLECT METH UR: (no result)
UROBILINOGEN UR STRIP-MCNC: (no result) MG/DL
WBC #/AREA URNS HPF: (no result) /HPF (ref 0–5)

## 2022-08-29 NOTE — NUR
PHYSICIAN COMMUNICATION
*LATE ENTRY*
AROUND 2130 DR KELLOGG IN TO SEE PT & HE ORDERED 1L FLUID RESTRICTION, 24HR
URINE, 12.5 GM IV ALBUMIN QD & 1X DOSE NOW, 2MG IV BUMEX QD & 1X DOSE NOW.

## 2022-08-29 NOTE — NUR
PATIENT TRANSITIONED TO COMFORT CARE. FAMILY AT BEDSIDE BRIEFLY. PATIENT
RESTING COMFORTABLY ON SIDE. PATIENT REPOSITIONS SELF PREFERRING TO LAY ON HER
RIGHT SIDE. BRYANT CATHETER IN PLACE DRAINING TO GRAVITY. WILL CONTINUE TO
MONITOR.

## 2022-08-29 NOTE — NUR
SHIFT SUMMARY
NONRESPONSIVE & NONVERBAL THIS SHIFT. WILL OCCASIONALLY OPEN EYES TO VERBAL OR
PAINFUL STIMULI, HOWEVER CLOSES THEM BRIEFLY AFTER. UNABLE TO FOLLOW
DIRECTIONS. VSS. HAD 1 EPISODE EMESIS, READ PREVIOUS NOTE. NO S/SX PAIN T/O
NIGHT, HAS BEEN RESTING SOUNDLY T/O NIGHT. PT HASNT BEEN ABLE TO WAKEN ENOUGH
TO SAFETLY TOLERATE ANY PO INTAKE, THEREFORE NO OUTPUT THIS SHIFT. WILL
BLADDER SCAN TO DOUBLE CHECK. +2 EDEMA BLE. REPOSITIONED PRN. CALL LIGHT & BED
ALARM IN PLACE.

## 2022-08-29 NOTE — NUR
Update to Dr Jacques's office staff re: pt's husbands anger and frustration,
violent comments so that they are aware of 's emotional instability,
anger over terminal dx and that he is directing his anger at Dr Jacques at this
time.  did not make threat or verbalize plan to do harm but expressed
extreme anger and appears distraught and not thinking clearly.

## 2022-08-29 NOTE — NUR
Spiritual care visit conducted. Pt is minimally responsive and , Librado
who speaks very loud, is present in the rm.  He tells me his frustration with
his wife's sudden decline and expresses how challenging this is for him to
understand given how well she was doing as recent as this last Friday. Pt's
Doctor comes in almost on cue and kindly explains what is happening with
pt while I step out. After the visit, [place] and I meet with
Librado. Librado struggles with the news. Pt states after talking with the doctor,
"He said she is going to die. She may as well be dead now. Look at her."
He seems to aim his frustrations at pt's Oncologist for not making it clear
that pt was at this critical stage or that pt's current condition would even
be possible. Librado at one point even states, "I'm so angry, I want to put a
bullet in his head." He also voices his anxiety over not knowing what to do
and feeling completely overwhelmed. Librado leaves mumbling to himself. I visit
with pt after, providing a calming presence and speaking to her quietly,
reassuring her of the care and love of the people around her and talking to
her about her braclet that reads, Life is tough, but so am I." I provide
gentle  and encouragement. Pt responds well and relaxes her body and
face as I do so. I will continue to remain available to pt and family and
follow up with pt to see his threat level and document accordingly.

## 2022-08-29 NOTE — NUR
PATIENT NO LONGER ON COMFORT CARE. PATIENT OCCASIONALLY OPENS EYES WHEN
CALLING HER NAME BUT IMMEDIATELY CLOSES THEM. DOES NOT ANSWER ANY QUESTIONS OR
FOLLOW COMMANDS. NO SIGNS OF DISCOMFORT. PATIENT REPOSITIONS SELF. LAB
CURRENTLY DRAWING LABS. CT TO BE DONE AT 1800. WILL CONTINUE TO MONITOR.

## 2022-08-29 NOTE — NUR
EMESIS
ROUGHLY AROUND 2330 THIS NURSE HEARD PT RETCHING. WENT INTO RM TO FIND PT
COVERED IN BROWN EMESIS & STILL VOMITING, HAD CNA GET SUCTION EQUIPMENT,
ROLLED PT ON HER SIDE. UNMEASURED, BUT BEST GUESS WOULD SAY ROUGHLY 200-300ML
BROWN EMESIS SINCE BLANKETS & PT SATURATED. GAVE REGLAN PER EMAR, CLEANED PT
BY GIVING PARTIAL BED BATH. WILL CONT TO MONITOR.

## 2022-08-29 NOTE — NUR
Pal Care visit to room, just as physician was completing rounding.  has
requested that we transition to comfort care after understanding from the Dr
that pt may not rally from this and that she is at EOL with her pancreatic CA
and comorbidities.  speaks very loudly due to [place] but is even more loud
and expressing anger and frustration with pt's oncologist, "never telling him
she was going to die".  and I listened and supported . He is
clearly overwhelmed with all the "million things" he needs to do now.
Encouraged him to spend time at the bedside of his wife but he states, "Why,
she is already dead and I have too much to do". After  left, 
and I went to the pt's bedside to be present and provide calm, quiet
affirmations. Pt is minimally responsive to voice and touch but not comatose
or obtunded. Some nonverbal indicators of pain noted and reported to pt's RN.
Pt has rojas placed now and very dark yellow urine noted in drainage bag.
Discussed medications for comfort with RN. I do not believe pt's  would
be able to manage home hospice care emotionally or physically. They do not
have family locally, who could assist him. Plan for cont. supportive visits to
pt and  as indicated.

## 2022-08-30 LAB
ALBUMIN SERPL BCP-MCNC: 2.3 G/DL (ref 3.4–5)
ALBUMIN SERPL BCP-MCNC: 2.3 G/DL (ref 3.4–5)
ALBUMIN/GLOB SERPL: 1 {RATIO} (ref 0.8–1.8)
ALBUMIN/GLOB SERPL: 1 {RATIO} (ref 0.8–1.8)
ALT SERPL W P-5'-P-CCNC: 24 U/L (ref 12–78)
ALT SERPL W P-5'-P-CCNC: 26 U/L (ref 12–78)
ANION GAP SERPL CALCULATED.4IONS-SCNC: 10 MMOL/L (ref 6–16)
ANION GAP SERPL CALCULATED.4IONS-SCNC: 13 MMOL/L (ref 6–16)
AST SERPL W P-5'-P-CCNC: 38 U/L (ref 12–37)
AST SERPL W P-5'-P-CCNC: 41 U/L (ref 12–37)
BASOPHILS # BLD AUTO: 0.07 K/MM3 (ref 0–0.23)
BASOPHILS # BLD: 0 K/MM3 (ref 0–0.23)
BASOPHILS NFR BLD AUTO: 0 % (ref 0–2)
BASOPHILS NFR BLD: 0 % (ref 0–2)
BILIRUB DIRECT SERPL-MCNC: 0.9 MG/DL (ref 0–0.3)
BILIRUB INDIRECT SERPL-MCNC: 0.5 MG/DL (ref 0.1–0.7)
BILIRUB SERPL-MCNC: 1.3 MG/DL (ref 0.1–1)
BILIRUB SERPL-MCNC: 1.4 MG/DL (ref 0.1–1)
BUN SERPL-MCNC: 38 MG/DL (ref 8–24)
BUN SERPL-MCNC: 42 MG/DL (ref 8–24)
CALCIUM SERPL-MCNC: 8.8 MG/DL (ref 8.5–10.1)
CALCIUM SERPL-MCNC: 8.8 MG/DL (ref 8.5–10.1)
CHLORIDE SERPL-SCNC: 109 MMOL/L (ref 98–108)
CHLORIDE SERPL-SCNC: 110 MMOL/L (ref 98–108)
CO2 SERPL-SCNC: 18 MMOL/L (ref 21–32)
CO2 SERPL-SCNC: 27 MMOL/L (ref 21–32)
CREAT SERPL-MCNC: 0.73 MG/DL (ref 0.4–1)
CREAT SERPL-MCNC: 0.75 MG/DL (ref 0.4–1)
CRP SERPL-MCNC: 1.71 MG/DL (ref 0–0.3)
DEPRECATED RDW RBC AUTO: 59.2 FL (ref 35.1–46.3)
DEPRECATED RDW RBC AUTO: 62.8 FL (ref 35.1–46.3)
EOSINOPHIL # BLD AUTO: 0.05 K/MM3 (ref 0–0.68)
EOSINOPHIL # BLD: 0 K/MM3 (ref 0–0.68)
EOSINOPHIL NFR BLD AUTO: 0 % (ref 0–6)
EOSINOPHIL NFR BLD: 0 % (ref 0–6)
ERYTHROCYTE [DISTWIDTH] IN BLOOD BY AUTOMATED COUNT: 20 % (ref 11.7–14.2)
ERYTHROCYTE [DISTWIDTH] IN BLOOD BY AUTOMATED COUNT: 20.2 % (ref 11.7–14.2)
FERRITIN SERPL-MCNC: 3388 NG/ML (ref 8–252)
GLOBULIN SER CALC-MCNC: 2.4 G/DL (ref 2.2–4)
GLOBULIN SER CALC-MCNC: 2.4 G/DL (ref 2.2–4)
GLUCOSE SERPL-MCNC: 349 MG/DL (ref 70–99)
GLUCOSE SERPL-MCNC: 362 MG/DL (ref 70–99)
HCT VFR BLD AUTO: 19.9 % (ref 33–51)
HCT VFR BLD AUTO: 22 % (ref 33–51)
HGB BLD-MCNC: 7.4 G/DL (ref 11.5–16)
HGB BLD-MCNC: 8.2 G/DL (ref 11.5–16)
HGB RETIC QN AUTO: 36.5 PG (ref 28.2–36.6)
IMM GRANULOCYTES # BLD AUTO: 2.66 K/MM3 (ref 0–0.1)
IMM GRANULOCYTES NFR BLD AUTO: 4 % (ref 0–1)
IMM RETICS NFR: 9.6 % (ref 2.3–16)
LEUKOCYTE ESTERASE UR QL STRIP: (no result)
LYMPHOCYTES # BLD AUTO: 0.2 K/MM3 (ref 0.84–5.2)
LYMPHOCYTES # BLD: 0.63 K/MM3 (ref 0.84–5.2)
LYMPHOCYTES NFR BLD AUTO: 0 % (ref 21–46)
LYMPHOCYTES NFR BLD: 1 % (ref 21–46)
MAGNESIUM SERPL-MCNC: 1.7 MG/DL (ref 1.6–2.4)
MCHC RBC AUTO-ENTMCNC: 37.2 G/DL (ref 31.5–36.5)
MCHC RBC AUTO-ENTMCNC: 37.3 G/DL (ref 31.5–36.5)
MCV RBC AUTO: 94 FL (ref 80–100)
MCV RBC AUTO: 96 FL (ref 80–100)
METAMYELOCYTES # BLD MANUAL: 0.63 K/MM3 (ref 0–0)
METAMYELOCYTES NFR BLD MANUAL: 1 % (ref 0–0)
MONOCYTES # BLD AUTO: 2.54 K/MM3 (ref 0.16–1.47)
MONOCYTES # BLD: 1.27 K/MM3 (ref 0.16–1.47)
MONOCYTES NFR BLD AUTO: 4 % (ref 4–13)
MONOCYTES NFR BLD: 2 % (ref 4–13)
NEUTROPHILS # BLD AUTO: 55.53 K/MM3 (ref 1.96–9.15)
NEUTROPHILS NFR BLD AUTO: 91 % (ref 41–73)
NEUTS BAND NFR BLD MANUAL: 2 % (ref 0–8)
NEUTS SEG # BLD MANUAL: 61.21 K/MM3 (ref 1.96–9.15)
NEUTS SEG NFR BLD MANUAL: 94 % (ref 41–73)
NRBC # BLD AUTO: 0.49 K/MM3 (ref 0–0.02)
NRBC # BLD AUTO: 0.5 K/MM3 (ref 0–0.02)
NRBC BLD AUTO-RTO: 0.8 /100 WBC (ref 0–0.2)
NRBC BLD AUTO-RTO: 0.8 /100 WBC (ref 0–0.2)
PHOSPHATE SERPL-MCNC: 3.6 MG/DL (ref 2.5–4.9)
PLATELET # BLD AUTO: 81 K/MM3 (ref 150–400)
PLATELET # BLD AUTO: 99 K/MM3 (ref 150–400)
POTASSIUM SERPL-SCNC: 2.7 MMOL/L (ref 3.5–5.5)
POTASSIUM SERPL-SCNC: 3.1 MMOL/L (ref 3.5–5.5)
PROT SERPL-MCNC: 4.7 G/DL (ref 6.4–8.2)
PROT SERPL-MCNC: 4.7 G/DL (ref 6.4–8.2)
PROT UR STRIP-MCNC: (no result) MG/DL
PROT UR-MCNC: 26.2 MG/DL (ref 0–11.9)
PROTHROMBIN TIME: 24.7 SEC (ref 9.7–11.5)
RBC #/AREA URNS HPF: (no result) /HPF (ref 0–2)
RETICS/RBC NFR AUTO: 1.32 % (ref 0.5–2.5)
SODIUM SERPL-SCNC: 141 MMOL/L (ref 136–145)
SODIUM SERPL-SCNC: 146 MMOL/L (ref 136–145)
SP GR SPEC: 1.01 (ref 1–1.02)
TIBC SERPL-MCNC: 79 UG/DL (ref 250–450)
TOTAL CELLS COUNTED BLD: 100
URN SPEC COLLECT METH UR: (no result)
UROBILINOGEN UR STRIP-MCNC: (no result) MG/DL
WBC #/AREA URNS HPF: (no result) /HPF (ref 0–5)

## 2022-08-30 NOTE — NUR
END OF SHIFT SUMMARY:
PATIENT HAD SPONTANEOUS MOVEMENT OF ARMS, NECK AND HEAD THROUGHOUT THE SHIFT.
PATIENT RESPONDES TO PAINFUL STIMULI AND BY THE END OF THE SHIFT, PATIENT
OPENED HER EYELIDS IN RESPONSE TO HER NAME. PATIENT UNABLE TO FOLLOW ANY
COMMANDS.
THROUGHOUT THE SHIFT, IN COMMUNICATION WITH DR. KELLOGG, DR. OLIVERA AND DR. GARCIA. THE MRI WAS UNABLE TO BE COMPLETED DUE TO THE PATIENT'S MOVEMENT OF
HER HEAD BACK AND FORTH - DR. GARCIA AND DR OLIVERA NOTIFIED.  EEG COMPLETED IN
THE ROOM BY MID-AFTERNOON. DR. GARCIA ABLE TO ROUND ON PATIENT AT THIS TIME.
PER DR. GARCIA, THE IMAGING IS BEING SENT TO DR. IBRAHIM, NEUROLOGIST.
PATIENT CONTINUES TO HAVE CRANBERRY COLORED URINE WITH SOME RED SEDIMENT IN
THE BRYANT CATHETER (DR. KELLOGG NOTIFIED).  24 HOUR URINE WILL BE COMPLETED AT
22:30 TONIGHT.
PATIENT'S  EXPRESSES ANXIETY AND FRUSTRATION OVER THE PATIENT'S
SITUATION AND THE LACK OF DEFINITIVE ANSWERS. RN AND FAMILY PROVIDED SUPPORT
AND INFORMATION IN AN ATTEMPT TO ADDRESS THESE CONCERNS.

## 2022-08-30 NOTE — NUR
Attempted to check in on pt.  Comfort care was discontinued yesterday and pt
is having some testing today.  She is currently sleeping and appears to be
without distress.  Left undisturbed at this time.  No family currently at the
bedside.  Received a call from Dr. Figueredo who updated this nurse on current
plan for a MRI and EEG today.  Pt's code status remains a DNR/DNI per Dr. Figueredo.  Plan is to be a support to pt and family while awaiting the test
results from the MRI and EEG.  PC to work with MDs and pt and family going
forward to formulate a plan for continued care once the results of these
tests are known.

## 2022-08-30 NOTE — NUR
SHIFT SUMMARY
OPENS EYES BRIEFLY TO VERBAL STIMULI, THEN FALLS BACK ASLEEP. NONVERBAL
STILL. NO EMESIS THIS SHIFT.  NO S/SX PAIN OR DYSPNEA. VSS. +3 EDEMA BLE. 24HR
URINE COLLECTION STARTED @2230. Q6 CBG REQUIRING COVERAGE. HAD CRITICAL
LACTIC ACID @4.6. WILL MONITOR.

## 2022-08-30 NOTE — NUR
NURSE NOTE: KENDRICK DICKINSON CONTACTED TO INFORM OF PT'S ELEVATED BLOOD SUGAR CURRENTLY
, PER MD ADMINISTER 5 UNITS LANTUS- (LONG ACTING INSULIN) INSTEAD OF THE
SCHEDULED 10 UNITS LANTUS FOR TONIGHT WITH NO ADDITIONAL HUMALOG CORRECTION.
MD ORDERED TO CHART NOT GIVEN FOR THE SCHEDULED 10 UNITS LANTUS AND ORDER 5
UNITS LANTUS NOW- ONCE- PER KENDRICK DICKINSON, DAY SHIFT PHYSICIAN CAN REVIEW AND CHANGE
ORDERS FOR FURTHER INSULIN ADMINISTRATION IF NEEDED.

## 2022-08-31 LAB
ALBUMIN SERPL BCP-MCNC: 2.3 G/DL (ref 3.4–5)
ANION GAP SERPL CALCULATED.4IONS-SCNC: 9 MMOL/L (ref 6–16)
BASOPHILS # BLD: 0 K/MM3 (ref 0–0.23)
BASOPHILS NFR BLD: 0 % (ref 0–2)
BUN SERPL-MCNC: 35 MG/DL (ref 8–24)
CALCIUM SERPL-MCNC: 8.2 MG/DL (ref 8.5–10.1)
CHLORIDE SERPL-SCNC: 104 MMOL/L (ref 98–108)
CO2 SERPL-SCNC: 32 MMOL/L (ref 21–32)
CREAT SERPL-MCNC: 0.59 MG/DL (ref 0.4–1)
DEPRECATED RDW RBC AUTO: 60.4 FL (ref 35.1–46.3)
EOSINOPHIL # BLD: 0 K/MM3 (ref 0–0.68)
EOSINOPHIL NFR BLD: 0 % (ref 0–6)
ERYTHROCYTE [DISTWIDTH] IN BLOOD BY AUTOMATED COUNT: 19.8 % (ref 11.7–14.2)
GLUCOSE SERPL-MCNC: 367 MG/DL (ref 70–99)
HCT VFR BLD AUTO: 20 % (ref 33–51)
HGB BLD-MCNC: 7.4 G/DL (ref 11.5–16)
MAGNESIUM SERPL-MCNC: 1.5 MG/DL (ref 1.6–2.4)
MCHC RBC AUTO-ENTMCNC: 37 G/DL (ref 31.5–36.5)
MCV RBC AUTO: 94 FL (ref 80–100)
MONOCYTES # BLD: 2.49 K/MM3 (ref 0.16–1.47)
MONOCYTES NFR BLD: 4 % (ref 4–13)
NEUTS BAND NFR BLD MANUAL: 8 % (ref 0–8)
NEUTS SEG # BLD MANUAL: 59.76 K/MM3 (ref 1.96–9.15)
NEUTS SEG NFR BLD MANUAL: 88 % (ref 41–73)
NRBC # BLD AUTO: 1.32 K/MM3 (ref 0–0.02)
NRBC BLD AUTO-RTO: 2.1 /100 WBC (ref 0–0.2)
PHOSPHATE SERPL-MCNC: 1.6 MG/DL (ref 2.5–4.9)
PLATELET # BLD AUTO: 73 K/MM3 (ref 150–400)
POTASSIUM SERPL-SCNC: 2.4 MMOL/L (ref 3.5–5.5)
SODIUM SERPL-SCNC: 145 MMOL/L (ref 136–145)
TOTAL CELLS COUNTED BLD: 100

## 2022-08-31 NOTE — NUR
Met pt's  today at bedside. He has hx of TBI, and became easily
frustrated. He admits he is tired; hasn't been sleeping well since his wife
has been hospitalized. He also admits to frustration of not knowing why his
wife's overall condition has declined so rapidly. Pt  went home for the
night, per Charge LAUREN Garcia. Palliative care will remain available, but will not
pursue any further visits at this time, as they are not therapeutic.

## 2022-08-31 NOTE — NUR
PT TRANSFERED FROM [facility] FLOOR. DR GARCIA AT BEDSIDE. PT MOVING RESTLESSLY, EYES
OPEN OCC. DOES NOT FOLLOW COMMANDS, MOANS, CRIES W CARE. EYES GAZE FAR RIGHT
OR FAR LEFT WHEN LIGHT PLACED. PUPILS 2/2MM EQUAL/REACTIVE. KPHOS PLACED ON
HOLD FOR NOW AND POTASSIUM CHLORIDE STARTED PER DR GARCIA. MAG INFUSING.
INSULIN GTT STARTED AT 2UNITS/HR. 1/2NS TO START. POWERGLIDE TO BE PLACED.
MEDIPORT IS ACCESSED.

## 2022-08-31 NOTE — NUR
PT HAS WOKEN UP A LITTLE MORE AS THE SHIFT HAS PROGRESSED. PT UNABLE TO FOLLOW
COMMANDS, BUT HAS EYE CONTACT WHEN SPEAKING TO HER, AND MOANS WHEN ASKING
QUESTIONS. PT IS A LOT MORE RESTLESS AND CRIES MORE FREQUENTLY. INSULIN TO
RESTART ONCE K+ >3.0. POTASSIUM INFUING NOW, LABS TO BE REDRAWN AT 2100.
PRELIM. EEG RESULTS GIVEN TO DR GARCIA.

## 2022-08-31 NOTE — NUR
DR GARCIA AND DR KELLOGG IN TO CHECK ON PT. LONG ACTING INSULIN HELD PER DR GARCIA. K+ INFUSING. ORDERS TO REPEAT K+ AFTER INFUSION AND TO CALL DR KELLOGG
WITH RESULTS. NO CHANGE IN PT'S NEURO STATUS FROM PRIOR ASSESSMENT.

## 2022-08-31 NOTE — NUR
SHIFT SUMMARY: ALERT TO SELF ONLY. THROUGOUT THE NIGHT PT HAS BEEN ALERT IN
ROOM REMAINING NON VERBAL ALTHOUGH WHEN STATING PATIENTS NAME- RESPONSE WILL
BE EYE CONTACT ONLY AND AN OCCASIONAL MOAN. THE ONLY VERBALIZATION NOTED
TONIGHT WAS PT RESPONDING "OW" WHEN REPOSITIONING IV. PT BEGAN BECOMING
INCREASINGLY RESTLESS- TOSSING AND TURNING IN THE BED AND ATTEMPTING TO GET
OUT OF BED INDEPENDENTLY. MARY LOU DICKINSON CONTACTED TO CLARIFY ADMINISTRATION OF
IV ATIVAN PRN. MD APPROVED PRN DOSE OF ATIVAN ADMINISTRATION FOR INCREASED
ANXIETY AND INNABILITY TO SLEEP. POST ADMINISTRATION OF 0.5MG ATIVAN PT WAS
ABLE TO REST COMFORTABLY- RESPONDING TO VERBAL STIMULI. BED ALARM REMAINS
ACTIVARED, BED IN LOW POSITION, CALL BELL AND BELONGINGS IN REACH.

## 2022-08-31 NOTE — NUR
ASSUMED CARE AT 1900
PATIENT IS ALERT AND APPEARS ORIENTED TO SELF. MOANS AND MOVES ALL
EXTREMITIES. UNABLE TO FOLLOW COMMANDS AND UNABLE TO REDIRECT/ORIENT. 02 SATS
>95% ON RA, LS CLEAR. HR SR 70s-80s WITH FREQUENT PVCs. BP STABLE. BRYANT
PATENT AND DRAINING RED URINE TO GRAVITY. RECTAL TUBE PLACED, PATIENT HAVING
MULTIPLE/FREQUENT LOOSE STOOL. POTASSIUM RECHECKED, REMAINS BELOW 3, KPHOS
INFUSING. WILL RECHECK ONCE KPHOS IS COMPLETE. SEE SHIFT ASSESSMENT FOR MORE
INFORMATION.

## 2022-08-31 NOTE — NUR
CRITICAL K+ OF 2.2 GIVEN TO DR GARCIA. INSULIN GTT PLACED ON HOLD. WILL
CONTINUE K+ RIDER AND RECHECK K+ AFTER INFUSION. LONG ACTING INSULIN ON HOLD
UNTIL NEXT BS CHECK AT 1200.  AT BEDSIDE. HE IS ANGEY, AND CONFUSED AS
TO WHY HIS WIFE IS NOT WAKING UP. PT DOES NOT SEEM TO GRASP INFORMATION GIVEN
TO HIM. DR GARCIA SPENT 30MIN AT BEDSIDE WITH . POLINA GIORDANO
SPEAKING WITH PT'S  AS WELL AS CHARGE RN.

## 2022-08-31 NOTE — NUR
Spiritual care visit conducted. Pt is lying in bed and minimally responsive,
Although she is opening her eyes more today in response to her name, she does
not really track with conversation at all. I provide prayer and encouragement
to pt. A short time later once Librado, pt's spouse arrives, I visit with Librado
and LAUREN Garcia. Librado voices his frustrations, as Jose and I answer his questions
and de-esculate his intensity. There is a point where he speaks in threatening
tones but when Jose calls him on it he backs off quickly and states the
harmlessness of his words and his frustration with the situation coming out in
a wrong way. He talks about how overwhelmed he is by her sudden decline, all
of the house hold issues that he doesn't understand and how he needs pt to
come around to get him straightened out. I normalize Librado's feelings,
encourage self-care and provide therapeutic listening and a calming presence.
Librado responds well and shows signs of reduced stress. I will continue to
remain available to pt and family.

## 2022-09-01 LAB
ALBUMIN SERPL BCP-MCNC: 2.2 G/DL (ref 3.4–5)
ALBUMIN SERPL BCP-MCNC: 2.4 G/DL (ref 3.4–5)
ANION GAP SERPL CALCULATED.4IONS-SCNC: 4 MMOL/L (ref 6–16)
ANION GAP SERPL CALCULATED.4IONS-SCNC: 6 MMOL/L (ref 6–16)
BASOPHILS # BLD: 0 K/MM3 (ref 0–0.23)
BASOPHILS NFR BLD: 0 % (ref 0–2)
BUN SERPL-MCNC: 18 MG/DL (ref 8–24)
BUN SERPL-MCNC: 24 MG/DL (ref 8–24)
CALCIUM SERPL-MCNC: 8 MG/DL (ref 8.5–10.1)
CALCIUM SERPL-MCNC: 8.2 MG/DL (ref 8.5–10.1)
CHLORIDE SERPL-SCNC: 102 MMOL/L (ref 98–108)
CHLORIDE SERPL-SCNC: 104 MMOL/L (ref 98–108)
CO2 SERPL-SCNC: 36 MMOL/L (ref 21–32)
CO2 SERPL-SCNC: 37 MMOL/L (ref 21–32)
CREAT SERPL-MCNC: 0.38 MG/DL (ref 0.4–1)
CREAT SERPL-MCNC: 0.45 MG/DL (ref 0.4–1)
DEPRECATED RDW RBC AUTO: 61.6 FL (ref 35.1–46.3)
EOSINOPHIL # BLD: 0 K/MM3 (ref 0–0.68)
EOSINOPHIL NFR BLD: 0 % (ref 0–6)
ERYTHROCYTE [DISTWIDTH] IN BLOOD BY AUTOMATED COUNT: 19.9 % (ref 11.7–14.2)
GLUCOSE SERPL-MCNC: 262 MG/DL (ref 70–99)
GLUCOSE SERPL-MCNC: 99 MG/DL (ref 70–99)
HCT VFR BLD AUTO: 19.6 % (ref 33–51)
HCT VFR BLD AUTO: 19.9 % (ref 33–51)
HGB BLD-MCNC: 7.3 G/DL (ref 11.5–16)
HGB BLD-MCNC: 7.3 G/DL (ref 11.5–16)
LYMPHOCYTES # BLD: 0.66 K/MM3 (ref 0.84–5.2)
LYMPHOCYTES NFR BLD: 1 % (ref 21–46)
MAGNESIUM SERPL-MCNC: 1.4 MG/DL (ref 1.6–2.4)
MAGNESIUM SERPL-MCNC: 1.7 MG/DL (ref 1.6–2.4)
MCHC RBC AUTO-ENTMCNC: 36.7 G/DL (ref 31.5–36.5)
MCV RBC AUTO: 94 FL (ref 80–100)
METAMYELOCYTES # BLD MANUAL: 0.66 K/MM3 (ref 0–0)
METAMYELOCYTES NFR BLD MANUAL: 1 % (ref 0–0)
MONOCYTES # BLD: 1.98 K/MM3 (ref 0.16–1.47)
MONOCYTES NFR BLD: 3 % (ref 4–13)
MYELOCYTES # BLD MANUAL: 1.98 K/MM3 (ref 0–0)
MYELOCYTES NFR BLD MANUAL: 3 % (ref 0–0)
NEUTS BAND NFR BLD MANUAL: 2 % (ref 0–8)
NEUTS SEG # BLD MANUAL: 60.75 K/MM3 (ref 1.96–9.15)
NEUTS SEG NFR BLD MANUAL: 90 % (ref 41–73)
NRBC # BLD AUTO: 4.1 K/MM3 (ref 0–0.02)
NRBC BLD AUTO-RTO: 6.2 /100 WBC (ref 0–0.2)
PHOSPHATE SERPL-MCNC: 0.9 MG/DL (ref 2.5–4.9)
PHOSPHATE SERPL-MCNC: 2.1 MG/DL (ref 2.5–4.9)
PLATELET # BLD AUTO: 59 K/MM3 (ref 150–400)
POTASSIUM SERPL-SCNC: 2.8 MMOL/L (ref 3.5–5.5)
POTASSIUM SERPL-SCNC: 3.5 MMOL/L (ref 3.5–5.5)
SODIUM SERPL-SCNC: 142 MMOL/L (ref 136–145)
SODIUM SERPL-SCNC: 147 MMOL/L (ref 136–145)
TOTAL CELLS COUNTED BLD: 100

## 2022-09-01 NOTE — NUR
BURDEN IN TO SEE PT AGAIN AND SPOKE EXTENSIVELY W PT'S . PLAN IS TO
CONTINUE CARE, PAIN AND ANTIANXIETY MEDS TO BE USED TO PROVIDE MORE COMFORT
FOR PT.

## 2022-09-01 NOTE — NUR
Spiritual carevisit conducted. Pt is moved from ICU-9 to PCU-5 and so I ask
pt's RN Qing if I can call pt's spouse, Librado to relay the info and check on
how he is doing emotionally/spiritually. I call Librado on the phone and give him
an up[date on pt's location. Then Librado talks at length about how overwhelmed
he is he is worried he will have to sell pt's car and thier home, he is
concerned about all the things he doen't know about the finances. I provide
companionship, encouragement and emotional support. Librado responds well and
shows signs of reduced stress.

## 2022-09-01 NOTE — NUR
SHIFT SUMMARY
PATIENT REMAINS ALERT BUT CONFUSED, MOANING AND AGITATED THROUGH THE NIGHT.
MEDICATED FOR AGITATION PER EMAR. 02 SATS >95% ON RA. HR SR WITH PVCs 80s. BP
STABLE. RECTAL TUBE DRAINING LIGHT BROWN LIQUID STOOL. BRYANT DRAINING RED
URINE TO GRAVITY. PATIENT MOVES HIPS CONSTANTLY, ATTEMPTS TO REPOSITION AND
PATIENT MOVES SELF OFF PILLOW. TALKED WITH HOSPITALIST AND WILL CALL DR. KELLOGG
REGARDING AM LABS.

## 2022-09-01 NOTE — NUR
CARE OF PT ASSUMED AT 0700. PT AWAKE, RESTLESS, NON-VERBAL OTHER THAN MOANING,
SAYING "OW", AND "HUH?" (ONCE WHEN NAME CALLED). DOES NOT FOLLOW COMMANDS.
ATTEMPTS TO PULL AT IV'S, CATHETER, AND RECTAL TUBE. CRITICAL LABS CALLED INTO
DR KELLOGG THIS AM, KPHOS INFUSING NOW, TO BE FOLLOWED BY K+. 1/2NS CHANGED TO
D5 AT 75CC/HR. DR PEREZ AND DR GARCIA IN TO SEE PT THIS AM.

## 2022-09-02 LAB
ALBUMIN SERPL BCP-MCNC: 1.9 G/DL (ref 3.4–5)
ANION GAP SERPL CALCULATED.4IONS-SCNC: 4 MMOL/L (ref 6–16)
BUN SERPL-MCNC: 17 MG/DL (ref 8–24)
CALCIUM SERPL-MCNC: 7.4 MG/DL (ref 8.5–10.1)
CHLORIDE SERPL-SCNC: 99 MMOL/L (ref 98–108)
CO2 SERPL-SCNC: 36 MMOL/L (ref 21–32)
CREAT SERPL-MCNC: 0.33 MG/DL (ref 0.4–1)
GLUCOSE SERPL-MCNC: 291 MG/DL (ref 70–99)
HCT VFR BLD AUTO: 18.1 % (ref 33–51)
HGB BLD-MCNC: 6.7 G/DL (ref 11.5–16)
MAGNESIUM SERPL-MCNC: 2 MG/DL (ref 1.6–2.4)
PHOSPHATE SERPL-MCNC: 3.7 MG/DL (ref 2.5–4.9)
POTASSIUM SERPL-SCNC: 3.7 MMOL/L (ref 3.5–5.5)
SODIUM SERPL-SCNC: 139 MMOL/L (ref 136–145)

## 2022-09-02 NOTE — NUR
END OF SHIFT:
    PATINET HAS BEEN UNCHNAGED FORM THIS MORNING FROM Q2 TURNS, DEXTROSED
STOPPED, W UNITS OF RBC'S. PATIENT STILL UNRESPONSIVE OPENS EYES TO NAME,
HUSBNAD ON BOARD FOR  DEBORAH AND PARENTAL FEEDING. PATIENT IS IN NO SIGN OF
ACUTE DISTRESS, PATIENT HAS BEEN Q6 CBG, WITH SS COVERAGE, SUPLEMENTAL OXYGEN
AT 2-3L SPO2 >94%. PATIENT BLOOD PRESSURE HAS BEEN SLIGHTLY ELEVATED BUT
NORMALIZED THROUGH THE DAY. PAIN TREATED PER MAR, SANDRA TOLERATED THE BLOOD
PRODUCTS WELL, AFEBRILE. NO SING OF ACUTE CARDIAC DISTRESS. DR. OLIVERA ORDERING
PROVIDER OF BLOOD. BRYANT STILL DRAINING TO GRAVITY DARK YELLOW WITH RED TINGE.
PATIENT DID NOT TOLERATE PLACMENT OF ANTON HOSE, WILL CONTINUE TO UNTIL SHIFT
CHANGE.

## 2022-09-03 LAB
ALBUMIN SERPL BCP-MCNC: 2.1 G/DL (ref 3.4–5)
BASOPHILS # BLD: 0 K/MM3 (ref 0–0.23)
BASOPHILS # BLD: 0 K/MM3 (ref 0–0.23)
BASOPHILS NFR BLD: 0 % (ref 0–2)
BASOPHILS NFR BLD: 0 % (ref 0–2)
BUN SERPL-MCNC: 14 MG/DL (ref 8–24)
CALCIUM SERPL-MCNC: 8.3 MG/DL (ref 8.5–10.1)
CHLORIDE SERPL-SCNC: 100 MMOL/L (ref 98–108)
CO2 SERPL-SCNC: 40 MMOL/L (ref 21–32)
CREAT SERPL-MCNC: 0.37 MG/DL (ref 0.4–1)
DEPRECATED RDW RBC AUTO: 58 FL (ref 35.1–46.3)
DEPRECATED RDW RBC AUTO: 58.4 FL (ref 35.1–46.3)
DEPRECATED RDW RBC AUTO: 60.3 FL (ref 35.1–46.3)
EOSINOPHIL # BLD: 0 K/MM3 (ref 0–0.68)
EOSINOPHIL # BLD: 0 K/MM3 (ref 0–0.68)
EOSINOPHIL NFR BLD: 0 % (ref 0–6)
EOSINOPHIL NFR BLD: 0 % (ref 0–6)
ERYTHROCYTE [DISTWIDTH] IN BLOOD BY AUTOMATED COUNT: 20.4 % (ref 11.7–14.2)
ERYTHROCYTE [DISTWIDTH] IN BLOOD BY AUTOMATED COUNT: 20.4 % (ref 11.7–14.2)
ERYTHROCYTE [DISTWIDTH] IN BLOOD BY AUTOMATED COUNT: 20.5 % (ref 11.7–14.2)
GLUCOSE SERPL-MCNC: 117 MG/DL (ref 70–99)
HCT VFR BLD AUTO: 28.3 % (ref 33–51)
HCT VFR BLD AUTO: 30.4 % (ref 33–51)
HCT VFR BLD AUTO: 30.8 % (ref 33–51)
HGB BLD-MCNC: 10.3 G/DL (ref 11.5–16)
HGB BLD-MCNC: 11 G/DL (ref 11.5–16)
HGB BLD-MCNC: 11.3 G/DL (ref 11.5–16)
LYMPHOCYTES # BLD: 0.51 K/MM3 (ref 0.84–5.2)
LYMPHOCYTES NFR BLD: 2 % (ref 21–46)
MAGNESIUM SERPL-MCNC: 1.6 MG/DL (ref 1.6–2.4)
MCHC RBC AUTO-ENTMCNC: 36.2 G/DL (ref 31.5–36.5)
MCHC RBC AUTO-ENTMCNC: 36.4 G/DL (ref 31.5–36.5)
MCHC RBC AUTO-ENTMCNC: 36.7 G/DL (ref 31.5–36.5)
MCV RBC AUTO: 89 FL (ref 80–100)
MCV RBC AUTO: 90 FL (ref 80–100)
MCV RBC AUTO: 92 FL (ref 80–100)
METAMYELOCYTES # BLD MANUAL: 0.25 K/MM3 (ref 0–0)
METAMYELOCYTES NFR BLD MANUAL: 1 % (ref 0–0)
MONOCYTES # BLD: 0.97 K/MM3 (ref 0.16–1.47)
MONOCYTES # BLD: 1.54 K/MM3 (ref 0.16–1.47)
MONOCYTES NFR BLD: 4 % (ref 4–13)
MONOCYTES NFR BLD: 6 % (ref 4–13)
MYELOCYTES # BLD MANUAL: 0.25 K/MM3 (ref 0–0)
MYELOCYTES NFR BLD MANUAL: 1 % (ref 0–0)
NEUTS BAND NFR BLD MANUAL: 1 % (ref 0–8)
NEUTS BAND NFR BLD MANUAL: 1 % (ref 0–8)
NEUTS SEG # BLD MANUAL: 23.13 K/MM3 (ref 1.96–9.15)
NEUTS SEG # BLD MANUAL: 23.3 K/MM3 (ref 1.96–9.15)
NEUTS SEG NFR BLD MANUAL: 89 % (ref 41–73)
NEUTS SEG NFR BLD MANUAL: 95 % (ref 41–73)
NRBC # BLD AUTO: 5.47 K/MM3 (ref 0–0.02)
NRBC # BLD AUTO: 6.55 K/MM3 (ref 0–0.02)
NRBC # BLD AUTO: 6.63 K/MM3 (ref 0–0.02)
NRBC BLD AUTO-RTO: 20.7 /100 WBC (ref 0–0.2)
NRBC BLD AUTO-RTO: 25.5 /100 WBC (ref 0–0.2)
NRBC BLD AUTO-RTO: 27.3 /100 WBC (ref 0–0.2)
PHOSPHATE SERPL-MCNC: 2.6 MG/DL (ref 2.5–4.9)
PLATELET # BLD AUTO: 123 K/MM3 (ref 150–400)
PLATELET # BLD AUTO: 38 K/MM3 (ref 150–400)
PLATELET # BLD AUTO: 42 K/MM3 (ref 150–400)
POTASSIUM SERPL-SCNC: 4.5 MMOL/L (ref 3.5–5.5)
SODIUM SERPL-SCNC: 139 MMOL/L (ref 136–145)
TOTAL CELLS COUNTED BLD: 100
TOTAL CELLS COUNTED BLD: 100

## 2022-09-03 NOTE — NUR
END OF SHIFT:
   CLINIMIX AT 50 AND TKO OF 20 INFUSING CURRENLTY.
   PATIENT HAS RECIEVED 1 UNIT OF PLATELETS WITH A NOE CHECK PLT OF ~132.
PATIENT IN IN NO SIGN OF ACUTE DISTRESS, PAIN CONTROLLED WITH EMAR MEDS.
PATIENT RECIEVED MRI AND HAS INCREASED SPEECH FROM PREVIOUS DAY, ONE ENTENCED
WAS OBSERVED BY THIS WRITER, COMPARED TO PREVIOUS DAY YES AND NO'S. PATIENT
 HAS BEEN INFORMED OF SITUATION AND GIVEN UPDATE PERIODICALLY
THROUGHOUT THE PATIENT HAS INCREASEDEDEMA IN THE BLE AT THE LEVEL OF THE
ANKLES, BLOOD PRESSURE HAS BEEN SLIGHTLY INCREASED WAS ABLE TO SWALLOW SOME
PILLS WITH PUDDING THIS EVENING. PT/OT/ST HAVE BEEN ORDERED. Q2 TURNS
PROVIDED BY THIS WRITER AND PCT'S. NO ACUTE CONCERNS FOR THIS PATIENT AT THIS
TIME. WILL CONTINUE TO MONITOR UNTIL SHIFT CHANGE.

## 2022-09-03 NOTE — NUR
NEURO: A&O TO SELF. MOANS BUT DENIES PAIN
CV: NORMOTENSIVE AFEBRILE
LUNG: RA WEAK NON-PRODUCTIVE COUGH
GI/: URINE CLEAR SHAREE. BRYANT FOR ACCURATE UO
SKIN: Q2 TURNS BONY PROMINENCES OFFLOADED AND PROTECTED

## 2022-09-04 LAB
ALBUMIN SERPL BCP-MCNC: 2 G/DL (ref 3.4–5)
ANION GAP SERPL CALCULATED.4IONS-SCNC: 2 MMOL/L (ref 6–16)
BASOPHILS # BLD: 0 K/MM3 (ref 0–0.23)
BASOPHILS NFR BLD: 0 % (ref 0–2)
BUN SERPL-MCNC: 17 MG/DL (ref 8–24)
CALCIUM SERPL-MCNC: 8.4 MG/DL (ref 8.5–10.1)
CHLORIDE SERPL-SCNC: 97 MMOL/L (ref 98–108)
CO2 SERPL-SCNC: 35 MMOL/L (ref 21–32)
CREAT SERPL-MCNC: 0.42 MG/DL (ref 0.4–1)
DEPRECATED RDW RBC AUTO: 58.8 FL (ref 35.1–46.3)
EOSINOPHIL # BLD: 0.21 K/MM3 (ref 0–0.68)
EOSINOPHIL NFR BLD: 1 % (ref 0–6)
ERYTHROCYTE [DISTWIDTH] IN BLOOD BY AUTOMATED COUNT: 20.3 % (ref 11.7–14.2)
GLUCOSE SERPL-MCNC: 258 MG/DL (ref 70–99)
HCT VFR BLD AUTO: 29.6 % (ref 33–51)
HGB BLD-MCNC: 10.4 G/DL (ref 11.5–16)
LYMPHOCYTES # BLD: 0.42 K/MM3 (ref 0.84–5.2)
LYMPHOCYTES NFR BLD: 2 % (ref 21–46)
MAGNESIUM SERPL-MCNC: 1.5 MG/DL (ref 1.6–2.4)
MCHC RBC AUTO-ENTMCNC: 35.1 G/DL (ref 31.5–36.5)
MCV RBC AUTO: 93 FL (ref 80–100)
MONOCYTES # BLD: 1.47 K/MM3 (ref 0.16–1.47)
MONOCYTES NFR BLD: 7 % (ref 4–13)
MYELOCYTES # BLD MANUAL: 0.21 K/MM3 (ref 0–0)
MYELOCYTES NFR BLD MANUAL: 1 % (ref 0–0)
NEUTS BAND NFR BLD MANUAL: 1 % (ref 0–8)
NEUTS SEG # BLD MANUAL: 18.75 K/MM3 (ref 1.96–9.15)
NEUTS SEG NFR BLD MANUAL: 88 % (ref 41–73)
NRBC # BLD AUTO: 3.66 K/MM3 (ref 0–0.02)
NRBC BLD AUTO-RTO: 17.4 /100 WBC (ref 0–0.2)
PHOSPHATE SERPL-MCNC: 2.7 MG/DL (ref 2.5–4.9)
PLATELET # BLD AUTO: 94 K/MM3 (ref 150–400)
POTASSIUM SERPL-SCNC: 4.9 MMOL/L (ref 3.5–5.5)
SODIUM SERPL-SCNC: 134 MMOL/L (ref 136–145)
TOTAL CELLS COUNTED BLD: 100

## 2022-09-04 NOTE — NUR
END OF SHIFT:
    SANDRA WAS THIS AM HYPOTENSIVE, HAS NOT BEEN DURING ENTIRE SHIFT.
RECIEVED LASIX 20 MG, AND NORMAL MEDCIATIONS. STOOL SENT OFF AND WAS NEGATIVE.
NO CONCERNS FROM THIS RN AT THIS TIME. PATIENT IS NOW COMPLETELY ALERT AND
ORIENTED WITH NO CONCERNS FOR STAFF AT THIS TIME. RA SPO2 >94%. PATIENT
DENIES CHEST PAIN SOB, PRESSURE AT THIST GIGI. NORMOTENSIVE AT THIS TIME.
EVY HAS BEEN HAVING MULTIPLE LOOSE STOOL LESSENING CURRENTLY. BRYANT STILL
IN PLACE PATIENT DID NOT WANT REMOVAL AT THIS TIME. PATIENTS SKIN C/D/I. WILL
CONTINUE TO MONITOR AT THIS TIME UNTIL SHIFT CHANGE

## 2022-09-04 NOTE — NUR
Assumed care of patient at 2300. Patient is A/Ox4, answers all questions
appropriately, and even makes jokes. Weakness noted t/o, Q2 turns. Maintains
over 95% on 1L NC. LS clear on top and dim at bases. SR on tele 80's. Denies
CP/pressure. BP stable. Strong +2 radials bl, and faint +1 pedals bl. 2+
pitting edema BLE with generalized nonpitting edema. Graduated compression
stockings in place. Mathias catheter draining red tinged/yellow urine with
visible blood clots. Pallor/fragile skin, ecchymosis to bl forearms and
reddened coccyx with mepilex in place. Mediport in RCW accessed. Patient was
able to eat this shift.
Large loose mucousy brown bowel movement this shift on
BSC. BP this shift went soft with SBP in 80's and MAP in 50's, doctor notified
and 500ml bolus given with good effect. Now SBP in 90's and MAP in 70's. Will
report to dayshift RN.

## 2022-09-05 LAB
ALBUMIN SERPL BCP-MCNC: 2 G/DL (ref 3.4–5)
ALBUMIN/GLOB SERPL: 1 {RATIO} (ref 0.8–1.8)
ALT SERPL W P-5'-P-CCNC: 43 U/L (ref 12–78)
ANION GAP SERPL CALCULATED.4IONS-SCNC: 4 MMOL/L (ref 6–16)
AST SERPL W P-5'-P-CCNC: 63 U/L (ref 12–37)
BILIRUB SERPL-MCNC: 1.6 MG/DL (ref 0.1–1)
BUN SERPL-MCNC: 22 MG/DL (ref 8–24)
CALCIUM SERPL-MCNC: 8.4 MG/DL (ref 8.5–10.1)
CHLORIDE SERPL-SCNC: 101 MMOL/L (ref 98–108)
CO2 SERPL-SCNC: 32 MMOL/L (ref 21–32)
CREAT SERPL-MCNC: 0.33 MG/DL (ref 0.4–1)
DEPRECATED RDW RBC AUTO: 58.9 FL (ref 35.1–46.3)
ERYTHROCYTE [DISTWIDTH] IN BLOOD BY AUTOMATED COUNT: 21.2 % (ref 11.7–14.2)
GLOBULIN SER CALC-MCNC: 2 G/DL (ref 2.2–4)
GLUCOSE SERPL-MCNC: 196 MG/DL (ref 70–99)
HCT VFR BLD AUTO: 31 % (ref 33–51)
HGB BLD-MCNC: 10.8 G/DL (ref 11.5–16)
MAGNESIUM SERPL-MCNC: 2.5 MG/DL (ref 1.6–2.4)
MCHC RBC AUTO-ENTMCNC: 34.8 G/DL (ref 31.5–36.5)
MCV RBC AUTO: 95 FL (ref 80–100)
NRBC # BLD AUTO: 1.37 K/MM3 (ref 0–0.02)
NRBC BLD AUTO-RTO: 6.8 /100 WBC (ref 0–0.2)
PHOSPHATE SERPL-MCNC: 1.6 MG/DL (ref 2.5–4.9)
PLATELET # BLD AUTO: 93 K/MM3 (ref 150–400)
POTASSIUM SERPL-SCNC: 3.2 MMOL/L (ref 3.5–5.5)
PROT SERPL-MCNC: 4 G/DL (ref 6.4–8.2)
SODIUM SERPL-SCNC: 137 MMOL/L (ref 136–145)

## 2022-09-05 NOTE — NUR
PT STATES SHE SLEPT WELL. WAS AWAKENED FOR LABS & VS. DENIES PAIN OR OTHER
DISCOMFORT, ATE APPLESAUCE CUP W MEDS. BRYANT DRNG SHAREE URINE. CALL LIGHT IN
REACH, CONT TO MONITOR.

## 2022-09-05 NOTE — NUR
SHIFT SUMMARY
PT IS ALERT AND ORIENTED X 4. HR AND BP STABLE, SPO2 >95% VIA ROOM AIR. SHE
HAS DENIED CHEST PAIN/PRESSURE, FEELINGS OF NAUSEA AS WELL AS SHORTNESS OF
BREATH. MEDIPORT INFUSING TKO PER EMAR ORDERS. POWERGLIDE IN GISSELLE IS SALINE
LOCKED. BRYANT CATHETER IS IN PLACE AND DRAINING TEA COLORED OUTPUT TO GRAVITY.
UROGRAM COMPLETED TODAY. PHYSICAL THERAPY AND OCCUPATIONAL THERAPY EVALUATED
PT TODAY. HER  WAS AT BEDSIDE DURING SHIFT. BLOOD GLUCOSE LEVELS
TRENDED UP DURING SHIFT AND PT WAS CHANGED FROM MEDIUM CORRECTION SCALE TO
HIGH CORRECTION SCALE. PROTECTIVE MEPILEX DRESSING IS IN PLACE OVER COCCYX AND
IS CLEAN/DRY. NO OTHER ACUTE CHANGES NOTED. CALL LIGHT IS IN REACH, PT NOW
APPEARS TO BE WATCHING T.V WHILE EATING DINNER.

## 2022-09-05 NOTE — NUR
Patient is alert and oriented.  She is very quiet with few needs.  She has not
slept this shift, denies pain. Mathias is patent with dk urine.  She has a
mediport to [facility], accessed.  She also has a powerglide Rt upper arm.  Pt up to
BSC x1 for BM.  Bedbath gien by CNA. Mepilex to coccyx for prevention. Monitor
shows SR. Lungs are clear and diminished, shallow breaths. Pt is on  RA.
Radha RN will assume care.  Call light in reach.

## 2022-09-06 LAB
ALBUMIN SERPL BCP-MCNC: 2 G/DL (ref 3.4–5)
ALBUMIN/GLOB SERPL: 0.9 {RATIO} (ref 0.8–1.8)
ALT SERPL W P-5'-P-CCNC: 39 U/L (ref 12–78)
ANION GAP SERPL CALCULATED.4IONS-SCNC: 3 MMOL/L (ref 6–16)
AST SERPL W P-5'-P-CCNC: 46 U/L (ref 12–37)
BASOPHILS # BLD AUTO: 0.09 K/MM3 (ref 0–0.23)
BASOPHILS NFR BLD AUTO: 1 % (ref 0–2)
BILIRUB SERPL-MCNC: 1.4 MG/DL (ref 0.1–1)
BUN SERPL-MCNC: 23 MG/DL (ref 8–24)
CALCIUM SERPL-MCNC: 8.5 MG/DL (ref 8.5–10.1)
CHLORIDE SERPL-SCNC: 103 MMOL/L (ref 98–108)
CO2 SERPL-SCNC: 30 MMOL/L (ref 21–32)
CREAT SERPL-MCNC: 0.33 MG/DL (ref 0.4–1)
DEPRECATED RDW RBC AUTO: 57.5 FL (ref 35.1–46.3)
EOSINOPHIL # BLD AUTO: 0.22 K/MM3 (ref 0–0.68)
EOSINOPHIL NFR BLD AUTO: 1 % (ref 0–6)
ERYTHROCYTE [DISTWIDTH] IN BLOOD BY AUTOMATED COUNT: 21.6 % (ref 11.7–14.2)
GLOBULIN SER CALC-MCNC: 2.3 G/DL (ref 2.2–4)
GLUCOSE SERPL-MCNC: 131 MG/DL (ref 70–99)
HCT VFR BLD AUTO: 30.3 % (ref 33–51)
HGB BLD-MCNC: 10.5 G/DL (ref 11.5–16)
IMM GRANULOCYTES # BLD AUTO: 0.67 K/MM3 (ref 0–0.1)
IMM GRANULOCYTES NFR BLD AUTO: 4 % (ref 0–1)
LYMPHOCYTES # BLD AUTO: 0.53 K/MM3 (ref 0.84–5.2)
LYMPHOCYTES NFR BLD AUTO: 3 % (ref 21–46)
MAGNESIUM SERPL-MCNC: 2.1 MG/DL (ref 1.6–2.4)
MCHC RBC AUTO-ENTMCNC: 34.7 G/DL (ref 31.5–36.5)
MCV RBC AUTO: 96 FL (ref 80–100)
MONOCYTES # BLD AUTO: 2.9 K/MM3 (ref 0.16–1.47)
MONOCYTES NFR BLD AUTO: 16 % (ref 4–13)
NEUTROPHILS # BLD AUTO: 14.23 K/MM3 (ref 1.96–9.15)
NEUTROPHILS NFR BLD AUTO: 76 % (ref 41–73)
NRBC # BLD AUTO: 0.38 K/MM3 (ref 0–0.02)
NRBC BLD AUTO-RTO: 2 /100 WBC (ref 0–0.2)
PHOSPHATE SERPL-MCNC: 1.5 MG/DL (ref 2.5–4.9)
PLATELET # BLD AUTO: 112 K/MM3 (ref 150–400)
POTASSIUM SERPL-SCNC: 3.4 MMOL/L (ref 3.5–5.5)
PROT SERPL-MCNC: 4.3 G/DL (ref 6.4–8.2)
SODIUM SERPL-SCNC: 136 MMOL/L (ref 136–145)

## 2022-09-06 NOTE — NUR
Spiritual care visit conducted. Pt doees not remember my previous visits but
[facility] is established. We talk about her heroic
journey with cancer and her gratitude to be alive. We talk about her spouse,
Librado and the struggles he has had and how excited he is about her recovery.  I
normalize her experience, explore spiritual beliefs and what is meaningful
during this season of her life, listen empathically and provide gentle 
and prayer. Pt responds well and shows signs of greater resolve and
thankfulness. I will continue to remain available to pt and family.

## 2022-09-06 NOTE — NUR
SHIFT SUMMARY
A/OX4, PLEASANT AND COOPERATIVE WITH CARE. DENIES PAIN OR SOB. TELE SR IN THE
70S. MEDIPORT TO RCW TKO. 1P ASSIST TO BSC. VSS, NO ACUTE CHANGES AT THIS
TIME. BED IN LOWEST POSITION WITH CALL LIGHT IN REACH. WILL CONTINUE TO
MONITOR AND REPORT TO ONCOMING RN.

## 2022-09-07 LAB
ALBUMIN SERPL BCP-MCNC: 2.6 G/DL (ref 3.4–5)
ALBUMIN/GLOB SERPL: 1.2 {RATIO} (ref 0.8–1.8)
ALT SERPL W P-5'-P-CCNC: 31 U/L (ref 12–78)
ANION GAP SERPL CALCULATED.4IONS-SCNC: 6 MMOL/L (ref 6–16)
AST SERPL W P-5'-P-CCNC: 52 U/L (ref 12–37)
BASOPHILS # BLD AUTO: 0.08 K/MM3 (ref 0–0.23)
BASOPHILS NFR BLD AUTO: 1 % (ref 0–2)
BILIRUB SERPL-MCNC: 2.2 MG/DL (ref 0.1–1)
BUN SERPL-MCNC: 20 MG/DL (ref 8–24)
CALCIUM SERPL-MCNC: 8.3 MG/DL (ref 8.5–10.1)
CHLORIDE SERPL-SCNC: 103 MMOL/L (ref 98–108)
CO2 SERPL-SCNC: 28 MMOL/L (ref 21–32)
CREAT SERPL-MCNC: 0.26 MG/DL (ref 0.4–1)
DEPRECATED RDW RBC AUTO: 58.1 FL (ref 35.1–46.3)
EOSINOPHIL # BLD AUTO: 0.07 K/MM3 (ref 0–0.68)
EOSINOPHIL NFR BLD AUTO: 1 % (ref 0–6)
ERYTHROCYTE [DISTWIDTH] IN BLOOD BY AUTOMATED COUNT: 23 % (ref 11.7–14.2)
GLOBULIN SER CALC-MCNC: 2.1 G/DL (ref 2.2–4)
GLUCOSE SERPL-MCNC: 165 MG/DL (ref 70–99)
HCT VFR BLD AUTO: 20.3 % (ref 33–51)
HCT VFR BLD AUTO: 23.9 % (ref 33–51)
HCT VFR BLD AUTO: 28.4 % (ref 33–51)
HGB BLD-MCNC: 7.1 G/DL (ref 11.5–16)
HGB BLD-MCNC: 8.5 G/DL (ref 11.5–16)
HGB BLD-MCNC: 9.7 G/DL (ref 11.5–16)
IMM GRANULOCYTES # BLD AUTO: 0.18 K/MM3 (ref 0–0.1)
IMM GRANULOCYTES NFR BLD AUTO: 1 % (ref 0–1)
LYMPHOCYTES # BLD AUTO: 0.39 K/MM3 (ref 0.84–5.2)
LYMPHOCYTES NFR BLD AUTO: 3 % (ref 21–46)
MAGNESIUM SERPL-MCNC: 1.7 MG/DL (ref 1.6–2.4)
MCHC RBC AUTO-ENTMCNC: 34.2 G/DL (ref 31.5–36.5)
MCV RBC AUTO: 97 FL (ref 80–100)
MONOCYTES # BLD AUTO: 2.11 K/MM3 (ref 0.16–1.47)
MONOCYTES NFR BLD AUTO: 17 % (ref 4–13)
NEUTROPHILS # BLD AUTO: 9.83 K/MM3 (ref 1.96–9.15)
NEUTROPHILS NFR BLD AUTO: 78 % (ref 41–73)
NRBC # BLD AUTO: 0.1 K/MM3 (ref 0–0.02)
NRBC BLD AUTO-RTO: 0.8 /100 WBC (ref 0–0.2)
PHOSPHATE SERPL-MCNC: 2.4 MG/DL (ref 2.5–4.9)
PLATELET # BLD AUTO: 131 K/MM3 (ref 150–400)
POTASSIUM SERPL-SCNC: 4.2 MMOL/L (ref 3.5–5.5)
PROT SERPL-MCNC: 4.7 G/DL (ref 6.4–8.2)
SODIUM SERPL-SCNC: 137 MMOL/L (ref 136–145)

## 2022-09-07 NOTE — NUR
Spiritual care visit conducted. Pt is lying in bed and awake, spouse, Librado is
bedside. They tell me the concern they have about blood that was discovered in
pt's stool. Librado is worried because he has his CT scan tomorrow for his
medical issues and so his way of coping is to get louder and try to get
answers for the pt that he can hang on to. I provide a calming presence,
gentle  and prayer. Pt and Librado respond well and show signs of
increased peace. I will continue to remain available to pt and family.

## 2022-09-07 NOTE — NUR
Assumed care of pt at 1900, A/Ox4 and in great spirits. Satting over 95% on
RA, VSS. SR on tele, 2+ pitting edema to BLE. Leon hose in place. Mathias in
place draining to gravity clear/odilia urine. Reports that she had diarrhea on
[facility]. Difficulty sleeping this shift, medicated with PRN with good relief.
No acute changes. Will report to [facility] RN.

## 2022-09-07 NOTE — NUR
Dr. Bro was called d/t recent H&H dropping significantly. Orders for clear
liquids only to prep tomorrow for procedure. Next H&H due at 0500. No BM for
me this shift.

## 2022-09-08 LAB
ALBUMIN SERPL BCP-MCNC: 2.5 G/DL (ref 3.4–5)
ALBUMIN/GLOB SERPL: 1.5 {RATIO} (ref 0.8–1.8)
ALT SERPL W P-5'-P-CCNC: 19 U/L (ref 12–78)
ANION GAP SERPL CALCULATED.4IONS-SCNC: 4 MMOL/L (ref 6–16)
AST SERPL W P-5'-P-CCNC: 30 U/L (ref 12–37)
BASOPHILS # BLD AUTO: 0.07 K/MM3 (ref 0–0.23)
BASOPHILS NFR BLD AUTO: 1 % (ref 0–2)
BILIRUB SERPL-MCNC: 1.5 MG/DL (ref 0.1–1)
BUN SERPL-MCNC: 20 MG/DL (ref 8–24)
CALCIUM SERPL-MCNC: 7.9 MG/DL (ref 8.5–10.1)
CHLORIDE SERPL-SCNC: 107 MMOL/L (ref 98–108)
CO2 SERPL-SCNC: 30 MMOL/L (ref 21–32)
CREAT SERPL-MCNC: 0.31 MG/DL (ref 0.4–1)
DEPRECATED RDW RBC AUTO: 58.4 FL (ref 35.1–46.3)
EOSINOPHIL # BLD AUTO: 0.1 K/MM3 (ref 0–0.68)
EOSINOPHIL NFR BLD AUTO: 1 % (ref 0–6)
ERYTHROCYTE [DISTWIDTH] IN BLOOD BY AUTOMATED COUNT: 23 % (ref 11.7–14.2)
GLOBULIN SER CALC-MCNC: 1.7 G/DL (ref 2.2–4)
GLUCOSE SERPL-MCNC: 127 MG/DL (ref 70–99)
HCT VFR BLD AUTO: 19.8 % (ref 33–51)
HCT VFR BLD AUTO: 24.5 % (ref 33–51)
HCT VFR BLD AUTO: 26.8 % (ref 33–51)
HCT VFR BLD AUTO: 27.1 % (ref 33–51)
HGB BLD-MCNC: 6.8 G/DL (ref 11.5–16)
HGB BLD-MCNC: 8.7 G/DL (ref 11.5–16)
HGB BLD-MCNC: 9.4 G/DL (ref 11.5–16)
HGB BLD-MCNC: 9.7 G/DL (ref 11.5–16)
IMM GRANULOCYTES # BLD AUTO: 0.16 K/MM3 (ref 0–0.1)
IMM GRANULOCYTES NFR BLD AUTO: 1 % (ref 0–1)
LYMPHOCYTES # BLD AUTO: 0.64 K/MM3 (ref 0.84–5.2)
LYMPHOCYTES NFR BLD AUTO: 6 % (ref 21–46)
MAGNESIUM SERPL-MCNC: 1.4 MG/DL (ref 1.6–2.4)
MCHC RBC AUTO-ENTMCNC: 34.3 G/DL (ref 31.5–36.5)
MCV RBC AUTO: 98 FL (ref 80–100)
MONOCYTES # BLD AUTO: 1.66 K/MM3 (ref 0.16–1.47)
MONOCYTES NFR BLD AUTO: 15 % (ref 4–13)
NEUTROPHILS # BLD AUTO: 8.76 K/MM3 (ref 1.96–9.15)
NEUTROPHILS NFR BLD AUTO: 77 % (ref 41–73)
NRBC # BLD AUTO: 0.05 K/MM3 (ref 0–0.02)
NRBC BLD AUTO-RTO: 0.4 /100 WBC (ref 0–0.2)
PHOSPHATE SERPL-MCNC: 2.1 MG/DL (ref 2.5–4.9)
PLATELET # BLD AUTO: 119 K/MM3 (ref 150–400)
POTASSIUM SERPL-SCNC: 3.9 MMOL/L (ref 3.5–5.5)
PROT SERPL-MCNC: 4.2 G/DL (ref 6.4–8.2)
SARS-COV-2 RNA RESP QL NAA+PROBE: NEGATIVE
SODIUM SERPL-SCNC: 141 MMOL/L (ref 136–145)

## 2022-09-08 PROCEDURE — 0W3P8ZZ CONTROL BLEEDING IN GASTROINTESTINAL TRACT, VIA NATURAL OR ARTIFICIAL OPENING ENDOSCOPIC: ICD-10-PCS | Performed by: INTERNAL MEDICINE

## 2022-09-08 NOTE — NUR
Patient remained A/Ox4, cooperative with care and in good spirits. BP remained
mostly stable, but began to drop during the night with MAP 65-70. Other VSS.
Hgb low this am, doctor ordered 1 unit PRBC that is now infusing. Repeat H&H
to follow 1 hour after transfusion ends.
Patient had 2 large liquid BM, dark red in color with a large amount of clots
and no actual fecal material identified. Patient is asymptomatic. Will report
to iris AGUILAR.

## 2022-09-08 NOTE — NUR
SHIFT SUMMARY
 
PT ALERT AND ORIENTED THIS SHIFT, ENGAGED IN CARE. BLOOD TRANSFUSION COMPLETED
THIS MORNING. PT VERY WEAK DURING MORNING, HOWEVER WAS ABLE TO WORK WITH
PT/OT. PT ON CLEAR LIQUIDS THIS SHIFT UNTIL DR. PHAM SAW PATIENT AT BEDSIDE,
THEN WATER/ICE CHIPS ONLY AND BOWEL PREP FOR COLONOSCOPY STARTED. PT HAD AN
EPISODE OF EMESIS LATE AFTERNOON, VOMITED CLEAR LIQUID ONLY. PT FELT EXTREMELY
COLD IN THE SECOND HALF OF THE SHIFT, VISIBLY SHIVERING. PT USED BEDSIDE
COMMODE THROUGH MOST OF THE DAY, AMBULATING WITH WALKER AND GAITBELT
AND STANDBY ASSIST FOR LINE MANAGEMENT,HOWEVER IN THE AFTERNOON THE PT BECAME
TOO WEAK TO GET BACK TO BED AND HAD TO BE ASSISTED BY TWO STAFF BACK TO BED
DUE TO WEAKNESS. PT HAD LIQUID, CLEAR STOOL WITH LARGE CLOTS NOTED.
COLONOSCOPY PLANNED FOR TONIGHT.

## 2022-09-08 NOTE — NUR
09/08/22 1940 Cori Linton
History, Chart, Medications and Allergies reviewed before start of
procedure. 3-LEAD EKG REVIEWED WITH PHYSICIAN PRIOR TO START OF
PROCEDURE. MONITOR INTACT WITH CONTINUOUS PULSE OXIMETRY AND
INTERMITTENT BP. O2 VIA N/C INTACT THROUGHOUT SEDATION/PROCEDURE.
MALLAMPATI CLASS 1 AIRWAY: COMPLETE VISULATIZATION OF THE SOFT PALATE.
PATIENT DETERMINED TO BE ASA APPROPRIATE FOR PROPOFOL SEDATION PRIOR
TO START OF PROCEDURE BY DR. PHAM.

## 2022-09-09 LAB
ALBUMIN SERPL BCP-MCNC: 2.6 G/DL (ref 3.4–5)
ALBUMIN/GLOB SERPL: 1.5 {RATIO} (ref 0.8–1.8)
ALT SERPL W P-5'-P-CCNC: 22 U/L (ref 12–78)
ANION GAP SERPL CALCULATED.4IONS-SCNC: 7 MMOL/L (ref 6–16)
AST SERPL W P-5'-P-CCNC: 25 U/L (ref 12–37)
BASOPHILS # BLD AUTO: 0.09 K/MM3 (ref 0–0.23)
BASOPHILS NFR BLD AUTO: 1 % (ref 0–2)
BILIRUB SERPL-MCNC: 1.9 MG/DL (ref 0.1–1)
BUN SERPL-MCNC: 21 MG/DL (ref 8–24)
CALCIUM SERPL-MCNC: 8.7 MG/DL (ref 8.5–10.1)
CHLORIDE SERPL-SCNC: 112 MMOL/L (ref 98–108)
CO2 SERPL-SCNC: 28 MMOL/L (ref 21–32)
CREAT SERPL-MCNC: 0.34 MG/DL (ref 0.4–1)
DEPRECATED RDW RBC AUTO: 53.4 FL (ref 35.1–46.3)
EOSINOPHIL # BLD AUTO: 0.08 K/MM3 (ref 0–0.68)
EOSINOPHIL NFR BLD AUTO: 1 % (ref 0–6)
ERYTHROCYTE [DISTWIDTH] IN BLOOD BY AUTOMATED COUNT: 21.4 % (ref 11.7–14.2)
GLOBULIN SER CALC-MCNC: 1.7 G/DL (ref 2.2–4)
GLUCOSE SERPL-MCNC: 149 MG/DL (ref 70–99)
HCT VFR BLD AUTO: 23.1 % (ref 33–51)
HCT VFR BLD AUTO: 23.3 % (ref 33–51)
HCT VFR BLD AUTO: 24.9 % (ref 33–51)
HGB BLD-MCNC: 7.9 G/DL (ref 11.5–16)
HGB BLD-MCNC: 8.1 G/DL (ref 11.5–16)
HGB BLD-MCNC: 8.5 G/DL (ref 11.5–16)
IMM GRANULOCYTES # BLD AUTO: 0.15 K/MM3 (ref 0–0.1)
IMM GRANULOCYTES NFR BLD AUTO: 1 % (ref 0–1)
LYMPHOCYTES # BLD AUTO: 0.62 K/MM3 (ref 0.84–5.2)
LYMPHOCYTES NFR BLD AUTO: 5 % (ref 21–46)
MAGNESIUM SERPL-MCNC: 2 MG/DL (ref 1.6–2.4)
MCHC RBC AUTO-ENTMCNC: 34.2 G/DL (ref 31.5–36.5)
MCV RBC AUTO: 96 FL (ref 80–100)
MONOCYTES # BLD AUTO: 1.92 K/MM3 (ref 0.16–1.47)
MONOCYTES NFR BLD AUTO: 16 % (ref 4–13)
NEUTROPHILS # BLD AUTO: 9.19 K/MM3 (ref 1.96–9.15)
NEUTROPHILS NFR BLD AUTO: 76 % (ref 41–73)
NRBC # BLD AUTO: 0.05 K/MM3 (ref 0–0.02)
NRBC BLD AUTO-RTO: 0.4 /100 WBC (ref 0–0.2)
PHOSPHATE SERPL-MCNC: 2.5 MG/DL (ref 2.5–4.9)
PLATELET # BLD AUTO: 162 K/MM3 (ref 150–400)
POTASSIUM SERPL-SCNC: 3.6 MMOL/L (ref 3.5–5.5)
PROT SERPL-MCNC: 4.3 G/DL (ref 6.4–8.2)
SODIUM SERPL-SCNC: 147 MMOL/L (ref 136–145)

## 2022-09-09 NOTE — NUR
Patient remained A/Ox4 and in good spirits. Went to scope today and 
came to bedside after to talk to her and her . Patient slept off and on
for the night. VSS. RA. Recieving TPN. CBG went to 50's, after some orange
juice it increased to just over 100. CBG q6h. 1 BM green in color - no sign of
bleeding or clots. Mathias draining to gravity pink urine with sediment. Will
report to dayshift LAUREN.

## 2022-09-09 NOTE — NUR
SHIFT SUMMARY
 
PATIENT TRANSFERRED FROM [facility] AT 1730. PATIENT IS SETTLED IN ROOM AND ALL
NEEDS MET. PATIENT HAS HAD NO ACUTE EVENTS SINCE TRANSFER. WILL RELAY ALL
INFORMATION TO SHIFT CHANGE.

## 2022-09-09 NOTE — NUR
TRANSFER TO MEDICAL
 
PT A&O X4. MEDICAL NO TELE STATUS. VSS. SPO2 > 92% ON RA. PT W/ LIQUID
GREEN/YELLOW, THEN YELLOW ONLY BMs THIS SHIFT, NO SIGNS OF BLOOD IN STOOL. PT
DENIES PAIN/DISCOMFORT. PT TOLERATING ADVANCEMENT TO ADA DIET. TPN INFUSING W/
PLAN TO DC ONCE BAG COMPLETE PER MD BURDEN & DIETICIAN SUNDAY RECOMMENDATION /
ORDER. MEDIPORT TO CONTINUE W/ NS TKO. MANNY SCHAEFER PATENT & DRAINING. REPORT
CALLED TO ACCEPTING MEDICAL FLOOR RN & PT TRANSFERRED UP TO  W/
BELONGINGS @ APPROX 1700.

## 2022-09-10 LAB
ALBUMIN SERPL BCP-MCNC: 2.7 G/DL (ref 3.4–5)
ANION GAP SERPL CALCULATED.4IONS-SCNC: 5 MMOL/L (ref 6–16)
BASOPHILS # BLD AUTO: 0.11 K/MM3 (ref 0–0.23)
BASOPHILS NFR BLD AUTO: 1 % (ref 0–2)
BUN SERPL-MCNC: 22 MG/DL (ref 8–24)
CALCIUM SERPL-MCNC: 8.6 MG/DL (ref 8.5–10.1)
CHLORIDE SERPL-SCNC: 109 MMOL/L (ref 98–108)
CO2 SERPL-SCNC: 27 MMOL/L (ref 21–32)
CREAT SERPL-MCNC: 0.32 MG/DL (ref 0.4–1)
DEPRECATED RDW RBC AUTO: 58.4 FL (ref 35.1–46.3)
EOSINOPHIL # BLD AUTO: 0.25 K/MM3 (ref 0–0.68)
EOSINOPHIL NFR BLD AUTO: 2 % (ref 0–6)
ERYTHROCYTE [DISTWIDTH] IN BLOOD BY AUTOMATED COUNT: 22.5 % (ref 11.7–14.2)
GLUCOSE SERPL-MCNC: 51 MG/DL (ref 70–99)
HCT VFR BLD AUTO: 23.8 % (ref 33–51)
HGB BLD-MCNC: 8.2 G/DL (ref 11.5–16)
IMM GRANULOCYTES # BLD AUTO: 0.1 K/MM3 (ref 0–0.1)
IMM GRANULOCYTES NFR BLD AUTO: 1 % (ref 0–1)
LYMPHOCYTES # BLD AUTO: 0.9 K/MM3 (ref 0.84–5.2)
LYMPHOCYTES NFR BLD AUTO: 7 % (ref 21–46)
MAGNESIUM SERPL-MCNC: 1.8 MG/DL (ref 1.6–2.4)
MCHC RBC AUTO-ENTMCNC: 34.5 G/DL (ref 31.5–36.5)
MCV RBC AUTO: 99 FL (ref 80–100)
MONOCYTES # BLD AUTO: 2 K/MM3 (ref 0.16–1.47)
MONOCYTES NFR BLD AUTO: 16 % (ref 4–13)
NEUTROPHILS # BLD AUTO: 8.95 K/MM3 (ref 1.96–9.15)
NEUTROPHILS NFR BLD AUTO: 73 % (ref 41–73)
NRBC # BLD AUTO: 0.05 K/MM3 (ref 0–0.02)
NRBC BLD AUTO-RTO: 0.4 /100 WBC (ref 0–0.2)
PHOSPHATE SERPL-MCNC: 1.6 MG/DL (ref 2.5–4.9)
PLATELET # BLD AUTO: 188 K/MM3 (ref 150–400)
POTASSIUM SERPL-SCNC: 4 MMOL/L (ref 3.5–5.5)
SODIUM SERPL-SCNC: 141 MMOL/L (ref 136–145)

## 2022-09-10 NOTE — NUR
A&Ox4. PLEASANT AND COOPERATIVE WITH CARE. BRYANT PATENT AND DRAINING
TEA-COLORED TO GRAVITY. BLOOD DRAW FROM MEDIPORT THIS AM. POWERGLIDE INFUSING.
STAYED IN BED AND SLEPT WELL T/O THE NIGHT. NO C/O PAIN OR DISCOMFORT. VSS. NO
ACUTE CONCERNS DURING SHIFT. WILL REPORT TO ONCOMING RN.

## 2022-09-10 NOTE — NUR
PT RESTING QUIETLY AT START OF SHIFT. PLEASANT AND CO-OP WITH CARE. TPN
INFUSING TO MEDIPORT PER EMAR, UNTIL BAG COMPLETE. MEDIPORT NOW HEPARIN LOCKED
PER PROTOCOL. PER REPORT, PT HAD JUST TX'D TO  PRIOR TO START OF SHIFT.
DR PHAM IN TO CK ON PT; CANCER MASS INCREASING. NO NEW ORDERS. DENIED FURTHER
NEEDS. NO C/O. CALL LT IN REACH.

## 2022-09-10 NOTE — NUR
DISCHARGE
PT A&OX4 @ TIME OF DC. PT PROVIDED W/ WRITTEN AND VERBAL INSTRUCT. VERBAL[facility]ED
[facility]DERSTAND[facility] PLAN TO FOLLOW UP W/ [facility] AND SCHEDULED
PCP.

## 2022-10-01 ENCOUNTER — HOSPITAL ENCOUNTER (INPATIENT)
Dept: HOSPITAL 95 - ER | Age: 72
LOS: 8 days | DRG: 91 | End: 2022-10-09
Attending: INTERNAL MEDICINE | Admitting: HOSPITALIST
Payer: MEDICARE

## 2022-10-01 VITALS — BODY MASS INDEX: 16.18 KG/M2 | WEIGHT: 94.8 LBS | HEIGHT: 64 IN

## 2022-10-01 DIAGNOSIS — K72.90: ICD-10-CM

## 2022-10-01 DIAGNOSIS — D63.1: ICD-10-CM

## 2022-10-01 DIAGNOSIS — G43.909: ICD-10-CM

## 2022-10-01 DIAGNOSIS — E11.22: ICD-10-CM

## 2022-10-01 DIAGNOSIS — C25.9: ICD-10-CM

## 2022-10-01 DIAGNOSIS — Z92.21: ICD-10-CM

## 2022-10-01 DIAGNOSIS — Z95.828: ICD-10-CM

## 2022-10-01 DIAGNOSIS — Z90.410: ICD-10-CM

## 2022-10-01 DIAGNOSIS — Z51.5: ICD-10-CM

## 2022-10-01 DIAGNOSIS — K83.1: ICD-10-CM

## 2022-10-01 DIAGNOSIS — I13.0: ICD-10-CM

## 2022-10-01 DIAGNOSIS — Z96.653: ICD-10-CM

## 2022-10-01 DIAGNOSIS — Z66: ICD-10-CM

## 2022-10-01 DIAGNOSIS — Z79.01: ICD-10-CM

## 2022-10-01 DIAGNOSIS — E11.649: ICD-10-CM

## 2022-10-01 DIAGNOSIS — K76.82: ICD-10-CM

## 2022-10-01 DIAGNOSIS — K21.9: ICD-10-CM

## 2022-10-01 DIAGNOSIS — B96.89: ICD-10-CM

## 2022-10-01 DIAGNOSIS — N39.0: ICD-10-CM

## 2022-10-01 DIAGNOSIS — G92.8: Primary | ICD-10-CM

## 2022-10-01 DIAGNOSIS — E03.9: ICD-10-CM

## 2022-10-01 DIAGNOSIS — E11.65: ICD-10-CM

## 2022-10-01 DIAGNOSIS — Z98.890: ICD-10-CM

## 2022-10-01 DIAGNOSIS — I50.9: ICD-10-CM

## 2022-10-01 DIAGNOSIS — E43: ICD-10-CM

## 2022-10-01 DIAGNOSIS — Z98.51: ICD-10-CM

## 2022-10-01 DIAGNOSIS — K86.89: ICD-10-CM

## 2022-10-01 DIAGNOSIS — Z88.5: ICD-10-CM

## 2022-10-01 DIAGNOSIS — Z79.899: ICD-10-CM

## 2022-10-01 DIAGNOSIS — Z90.49: ICD-10-CM

## 2022-10-01 DIAGNOSIS — N18.30: ICD-10-CM

## 2022-10-01 DIAGNOSIS — Z90.710: ICD-10-CM

## 2022-10-01 DIAGNOSIS — N17.9: ICD-10-CM

## 2022-10-01 DIAGNOSIS — E87.6: ICD-10-CM

## 2022-10-01 DIAGNOSIS — Z79.4: ICD-10-CM

## 2022-10-01 DIAGNOSIS — F32.A: ICD-10-CM

## 2022-10-01 LAB
ALBUMIN SERPL BCP-MCNC: 2.4 G/DL (ref 3.4–5)
ALBUMIN/GLOB SERPL: 0.9 {RATIO} (ref 0.8–1.8)
ALT SERPL W P-5'-P-CCNC: 54 U/L (ref 12–78)
ANION GAP SERPL CALCULATED.4IONS-SCNC: 11 MMOL/L (ref 6–16)
AST SERPL W P-5'-P-CCNC: 64 U/L (ref 12–37)
BASOPHILS # BLD: 0 K/MM3 (ref 0–0.23)
BASOPHILS NFR BLD: 0 % (ref 0–2)
BILIRUB SERPL-MCNC: 13.5 MG/DL (ref 0.1–1)
BUN SERPL-MCNC: 38 MG/DL (ref 8–24)
CALCIUM SERPL-MCNC: 8.7 MG/DL (ref 8.5–10.1)
CHLORIDE SERPL-SCNC: 97 MMOL/L (ref 98–108)
CO2 SERPL-SCNC: 26 MMOL/L (ref 21–32)
CREAT SERPL-MCNC: 1.39 MG/DL (ref 0.4–1)
DEPRECATED RDW RBC AUTO: 96.3 FL (ref 35.1–46.3)
EOSINOPHIL # BLD: 0 K/MM3 (ref 0–0.68)
EOSINOPHIL NFR BLD: 0 % (ref 0–6)
ERYTHROCYTE [DISTWIDTH] IN BLOOD BY AUTOMATED COUNT: 26.9 % (ref 11.7–14.2)
GLOBULIN SER CALC-MCNC: 2.6 G/DL (ref 2.2–4)
GLUCOSE SERPL-MCNC: 411 MG/DL (ref 70–99)
HCT VFR BLD AUTO: 24.6 % (ref 33–51)
HGB BLD-MCNC: 8.7 G/DL (ref 11.5–16)
LYMPHOCYTES # BLD: 0.5 K/MM3 (ref 0.84–5.2)
LYMPHOCYTES NFR BLD: 7 % (ref 21–46)
MCHC RBC AUTO-ENTMCNC: 35.4 G/DL (ref 31.5–36.5)
MCV RBC AUTO: 102 FL (ref 80–100)
MONOCYTES # BLD: 0.72 K/MM3 (ref 0.16–1.47)
MONOCYTES NFR BLD: 10 % (ref 4–13)
NEUTS SEG # BLD MANUAL: 5.99 K/MM3 (ref 1.96–9.15)
NEUTS SEG NFR BLD MANUAL: 83 % (ref 41–73)
NRBC # BLD AUTO: 0.02 K/MM3 (ref 0–0.02)
NRBC BLD AUTO-RTO: 0.3 /100 WBC (ref 0–0.2)
PLATELET # BLD AUTO: 152 K/MM3 (ref 150–400)
POTASSIUM SERPL-SCNC: 3.9 MMOL/L (ref 3.5–5.5)
PROT SERPL-MCNC: 5 G/DL (ref 6.4–8.2)
SODIUM SERPL-SCNC: 134 MMOL/L (ref 136–145)
TOTAL CELLS COUNTED BLD: 100

## 2022-10-01 PROCEDURE — C9113 INJ PANTOPRAZOLE SODIUM, VIA: HCPCS

## 2022-10-01 PROCEDURE — A9270 NON-COVERED ITEM OR SERVICE: HCPCS

## 2022-10-01 PROCEDURE — C1769 GUIDE WIRE: HCPCS

## 2022-10-01 PROCEDURE — G0378 HOSPITAL OBSERVATION PER HR: HCPCS

## 2022-10-01 PROCEDURE — C1894 INTRO/SHEATH, NON-LASER: HCPCS

## 2022-10-01 PROCEDURE — C1729 CATH, DRAINAGE: HCPCS

## 2022-10-01 PROCEDURE — C1887 CATHETER, GUIDING: HCPCS

## 2022-10-01 NOTE — NUR
pt unresponsive during visit.  very distruaght and fatigued and
repetative. States he has not slept in days. He is struggling to manage her
insulin. He was unsure about long acting and short acting insultins.
 updated physicians he may not be able to manage her. Spoke to him about some
placement for respite until they can get caregivers in the home. He wants her
home he feels she is dying and wants her with them.
  Will see if they can manager her blood sugars and if we admit her. Pt
 states she has lost more weight and is more juandiced. Will update dr Jacques. We discussed tolerance of care with  and transtion to hospice
when the suffering become to great of she cant tolerate treatment.

## 2022-10-02 LAB
ALBUMIN SERPL BCP-MCNC: 2.2 G/DL (ref 3.4–5)
ALBUMIN/GLOB SERPL: 0.9 {RATIO} (ref 0.8–1.8)
ALT SERPL W P-5'-P-CCNC: 47 U/L (ref 12–78)
ANION GAP SERPL CALCULATED.4IONS-SCNC: 9 MMOL/L (ref 6–16)
AST SERPL W P-5'-P-CCNC: 58 U/L (ref 12–37)
BILIRUB SERPL-MCNC: 13.7 MG/DL (ref 0.1–1)
BILIRUB UR QL STRIP: (no result)
BUN SERPL-MCNC: 39 MG/DL (ref 8–24)
CALCIUM SERPL-MCNC: 8 MG/DL (ref 8.5–10.1)
CHLORIDE SERPL-SCNC: 99 MMOL/L (ref 98–108)
CO2 SERPL-SCNC: 29 MMOL/L (ref 21–32)
CREAT SERPL-MCNC: 1.08 MG/DL (ref 0.4–1)
DEPRECATED RDW RBC AUTO: 93.7 FL (ref 35.1–46.3)
ERYTHROCYTE [DISTWIDTH] IN BLOOD BY AUTOMATED COUNT: 26.9 % (ref 11.7–14.2)
GLOBULIN SER CALC-MCNC: 2.5 G/DL (ref 2.2–4)
GLUCOSE SERPL-MCNC: 301 MG/DL (ref 70–99)
HCT VFR BLD AUTO: 22.5 % (ref 33–51)
HGB BLD-MCNC: 8.2 G/DL (ref 11.5–16)
KETONES UR STRIP-MCNC: (no result) MG/DL
LEUKOCYTE ESTERASE UR QL STRIP: (no result)
MCHC RBC AUTO-ENTMCNC: 36.4 G/DL (ref 31.5–36.5)
MCV RBC AUTO: 101 FL (ref 80–100)
NRBC # BLD AUTO: 0 K/MM3 (ref 0–0.02)
NRBC BLD AUTO-RTO: 0 /100 WBC (ref 0–0.2)
PLATELET # BLD AUTO: 149 K/MM3 (ref 150–400)
POTASSIUM SERPL-SCNC: 3.3 MMOL/L (ref 3.5–5.5)
PROT SERPL-MCNC: 4.7 G/DL (ref 6.4–8.2)
PROT UR STRIP-MCNC: (no result) MG/DL
RBC #/AREA URNS HPF: (no result) /HPF (ref 0–2)
SODIUM SERPL-SCNC: 137 MMOL/L (ref 136–145)
SP GR SPEC: 1.01 (ref 1–1.02)
UROBILINOGEN UR STRIP-MCNC: (no result) MG/DL
WBC #/AREA URNS HPF: (no result) /HPF (ref 0–5)

## 2022-10-02 NOTE — NUR
BEDSIDE SWALLOW EVAL COMPLETED WITH PT. PT PASSED EVAL. DR. PERRY NOTIFIED OF
SWALLOW EVAL OUTCOME. ORDER RECIEVED FOR [facility] SOFT ADA/LOW FAT DIET. ORDER
PLACED.

## 2022-10-02 NOTE — NUR
PHYSICIAN COMMUNICATION
CONTACTED DR BARRETO TO NOTIFY HIM THAT THE PATIENT'S BLOOD SUGAR .
DR BARRETO ORDERED 10 UNITS LANTUS SC NOW AND TO CHANGE HER HUMALOG TO Q6
HOURS AS PATIENT IS NPO.

## 2022-10-02 NOTE — NUR
SHIFT SUMMARY:
PT A/O X 1-2 AT SHIFT END. PT AWARE OF HER NAME AND HER  AT THIS TIME.
PT JAUNDICED, CONTINENT OF URINE WITH DARK SHAREE OUTPUT. PT DENIES
PAIN/NAUSEA. PT PASSED BEDSIDE SWALLOW EVAL AND DID WELL WITH MECH SOFT
DINNER. PT TOLERATING PO FLUIDS AND FOOD AT THIS TIME. IV FLUIDS NS W/20 MEQ
POTASSIUM CONTINUES TO RUN /HR. BS MANAGED AT THIS TIME.

## 2022-10-02 NOTE — NUR
SHIFT SUMMARY
PATIENT ALERT AND ORIENTED X2. DOESN'T SAY MUCH, MOSTLY ANSWERS YES OR NO. SHE
IS EXTREMELY WEAK. UA OBTAINED VIA STRAIGHT CATH. SHE IS CURRENTLY SLEEPING.
BED IN LOWEST POSITION WITH WHEELS LOCKED AND ALARM ON. CALL LIGHT WITHIN
REACH. REPORT GIVEN TO ONCOMING RN.

## 2022-10-03 LAB
ALBUMIN SERPL BCP-MCNC: 2 G/DL (ref 3.4–5)
ALBUMIN/GLOB SERPL: 0.8 {RATIO} (ref 0.8–1.8)
ALT SERPL W P-5'-P-CCNC: 44 U/L (ref 12–78)
ANION GAP SERPL CALCULATED.4IONS-SCNC: 6 MMOL/L (ref 6–16)
AST SERPL W P-5'-P-CCNC: 63 U/L (ref 12–37)
BASOPHILS # BLD AUTO: 0.02 K/MM3 (ref 0–0.23)
BASOPHILS # BLD: 0.06 K/MM3 (ref 0–0.23)
BASOPHILS NFR BLD AUTO: 0 % (ref 0–2)
BASOPHILS NFR BLD: 1 % (ref 0–2)
BILIRUB SERPL-MCNC: 15.6 MG/DL (ref 0.1–1)
BUN SERPL-MCNC: 31 MG/DL (ref 8–24)
CALCIUM SERPL-MCNC: 8.1 MG/DL (ref 8.5–10.1)
CHLORIDE SERPL-SCNC: 108 MMOL/L (ref 98–108)
CO2 SERPL-SCNC: 27 MMOL/L (ref 21–32)
CREAT SERPL-MCNC: 0.63 MG/DL (ref 0.4–1)
DEPRECATED RDW RBC AUTO: 97.7 FL (ref 35.1–46.3)
EOSINOPHIL # BLD AUTO: 0.13 K/MM3 (ref 0–0.68)
EOSINOPHIL # BLD: 0 K/MM3 (ref 0–0.68)
EOSINOPHIL NFR BLD AUTO: 2 % (ref 0–6)
EOSINOPHIL NFR BLD: 0 % (ref 0–6)
ERYTHROCYTE [DISTWIDTH] IN BLOOD BY AUTOMATED COUNT: 27.2 % (ref 11.7–14.2)
GLOBULIN SER CALC-MCNC: 2.6 G/DL (ref 2.2–4)
GLUCOSE SERPL-MCNC: 170 MG/DL (ref 70–99)
HCT VFR BLD AUTO: 25.6 % (ref 33–51)
HGB BLD-MCNC: 9.1 G/DL (ref 11.5–16)
IMM GRANULOCYTES # BLD AUTO: 0.03 K/MM3 (ref 0–0.1)
IMM GRANULOCYTES NFR BLD AUTO: 1 % (ref 0–1)
LYMPHOCYTES # BLD AUTO: 0.31 K/MM3 (ref 0.84–5.2)
LYMPHOCYTES # BLD: 0.18 K/MM3 (ref 0.84–5.2)
LYMPHOCYTES NFR BLD AUTO: 5 % (ref 21–46)
LYMPHOCYTES NFR BLD: 3 % (ref 21–46)
MCHC RBC AUTO-ENTMCNC: 35.5 G/DL (ref 31.5–36.5)
MCV RBC AUTO: 101 FL (ref 80–100)
MONOCYTES # BLD AUTO: 0.81 K/MM3 (ref 0.16–1.47)
MONOCYTES # BLD: 0.56 K/MM3 (ref 0.16–1.47)
MONOCYTES NFR BLD AUTO: 13 % (ref 4–13)
MONOCYTES NFR BLD: 9 % (ref 4–13)
NEUTROPHILS # BLD AUTO: 5.02 K/MM3 (ref 1.96–9.15)
NEUTROPHILS NFR BLD AUTO: 79 % (ref 41–73)
NEUTS SEG # BLD MANUAL: 5.49 K/MM3 (ref 1.96–9.15)
NEUTS SEG NFR BLD MANUAL: 87 % (ref 41–73)
NRBC # BLD AUTO: 0.02 K/MM3 (ref 0–0.02)
NRBC BLD AUTO-RTO: 0.3 /100 WBC (ref 0–0.2)
PLATELET # BLD AUTO: 134 K/MM3 (ref 150–400)
POTASSIUM SERPL-SCNC: 3.9 MMOL/L (ref 3.5–5.5)
PROT SERPL-MCNC: 4.6 G/DL (ref 6.4–8.2)
SODIUM SERPL-SCNC: 141 MMOL/L (ref 136–145)
TOTAL CELLS COUNTED BLD: 100

## 2022-10-03 PROCEDURE — BF101ZZ FLUOROSCOPY OF BILE DUCTS USING LOW OSMOLAR CONTRAST: ICD-10-PCS | Performed by: RADIOLOGY

## 2022-10-03 PROCEDURE — BF40ZZZ ULTRASONOGRAPHY OF BILE DUCTS: ICD-10-PCS | Performed by: RADIOLOGY

## 2022-10-03 PROCEDURE — 0F9930Z DRAINAGE OF COMMON BILE DUCT WITH DRAINAGE DEVICE, PERCUTANEOUS APPROACH: ICD-10-PCS | Performed by: RADIOLOGY

## 2022-10-03 NOTE — NUR
Spiritual care visit conducted. I sit with pt's spouse, Librado, while pt is in
surgery. Librado tells me about his health issues and how pt, he calls her "Red"
has been doing medically since her last hospitalization. He explains about how
he has prayed more in the last few months than he prayed while he was in
[place]. He also shares about how challenging it is for him when Red gets
confused from "high sugar levels." I provide therapeutic listening and a
calming presence. Librado responds well and shows signs of being comforted. I
will continue to remain available.

## 2022-10-03 NOTE — NUR
SHIFT SUMMARY:
PT A/O X 3, SLOW TO RESPOND AND LETHARGIC AT TIMES. PT HAD BILIARY DRAINAGE
PLACED THIS AFTERNOON. VITALS HAVE BEEN STABLE . PT DID NOT C/O PAIN EXCEPT
WHEN REPOSITIIONED POST PROCEDURE. FENTANYL ORDER RECEIVED FROM DR. GARDNER AND
25 MCG GIVEN. PT TOLERATED WELL AND BROUGHT HER 10/10 PAIN DOWN TO A 4/10. PT
REFUSED DINNER. BS HAVE BEEN STABLE AND CONTROLLED ON CURRENT REGIMEN. NS W 20
MEQ POTASSIUM CONTINUES /HR. PT IS PLEASANT AND COOPERATIVE WITH CARE.
VERY FRAIL AND WEAK.
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
## 2022-10-03 NOTE — NUR
Rn summary:  Patient is alert x 2-3.  She responds very slowly to questions
but answers appropriately.  Pt has rested well most of shift.  Pt is very
jaundiced and urine is very dk odilia.  Patient uses bedpan and is continent.
Pt states she is not in pain but feels anxious. Medicated with Atarax 25 mg
which was helpful.  Mepilex to red blancheable coccyx.  Call light in reach
and bed alarm on.

## 2022-10-03 NOTE — NUR
REPORT RECEIVED FROM BARBER AGUILAR. PT ARRIVED FROM [facility] [place] AT 1515. PT ARRIVED
AND RESPONDED TO VERBAL STIMULI. PT DENIES PAIN/NAUSEA.  BILIARY DRAIN
DRAINING DARK GREEN FLUID WITH APPROX 100 ML OUTPUT AT THIS TIME.  DRESSING TO
BILIARY SITE CDI LUQ. VITALS STABLE.  CONNECTED NS W/20 MEQ POTASSIUM 
PER ORDER. NEURO CHECKS UNREMARKABLE.

## 2022-10-04 LAB
ALBUMIN SERPL BCP-MCNC: 2.1 G/DL (ref 3.4–5)
ALBUMIN/GLOB SERPL: 0.8 {RATIO} (ref 0.8–1.8)
ALT SERPL W P-5'-P-CCNC: 49 U/L (ref 12–78)
ANION GAP SERPL CALCULATED.4IONS-SCNC: 4 MMOL/L (ref 6–16)
AST SERPL W P-5'-P-CCNC: 67 U/L (ref 12–37)
BASOPHILS # BLD AUTO: 0.02 K/MM3 (ref 0–0.23)
BASOPHILS NFR BLD AUTO: 0 % (ref 0–2)
BILIRUB SERPL-MCNC: 17.2 MG/DL (ref 0.1–1)
BUN SERPL-MCNC: 27 MG/DL (ref 8–24)
CALCIUM SERPL-MCNC: 8.4 MG/DL (ref 8.5–10.1)
CHLORIDE SERPL-SCNC: 112 MMOL/L (ref 98–108)
CO2 SERPL-SCNC: 27 MMOL/L (ref 21–32)
CREAT SERPL-MCNC: 0.52 MG/DL (ref 0.4–1)
DEPRECATED RDW RBC AUTO: 102.7 FL (ref 35.1–46.3)
EOSINOPHIL # BLD AUTO: 0.14 K/MM3 (ref 0–0.68)
EOSINOPHIL NFR BLD AUTO: 3 % (ref 0–6)
ERYTHROCYTE [DISTWIDTH] IN BLOOD BY AUTOMATED COUNT: 28 % (ref 11.7–14.2)
GLOBULIN SER CALC-MCNC: 2.7 G/DL (ref 2.2–4)
GLUCOSE SERPL-MCNC: 85 MG/DL (ref 70–99)
HCT VFR BLD AUTO: 26.1 % (ref 33–51)
HGB BLD-MCNC: 9 G/DL (ref 11.5–16)
IMM GRANULOCYTES # BLD AUTO: 0.01 K/MM3 (ref 0–0.1)
IMM GRANULOCYTES NFR BLD AUTO: 0 % (ref 0–1)
LYMPHOCYTES # BLD AUTO: 0.38 K/MM3 (ref 0.84–5.2)
LYMPHOCYTES NFR BLD AUTO: 8 % (ref 21–46)
MCHC RBC AUTO-ENTMCNC: 34.5 G/DL (ref 31.5–36.5)
MCV RBC AUTO: 103 FL (ref 80–100)
MONOCYTES # BLD AUTO: 0.58 K/MM3 (ref 0.16–1.47)
MONOCYTES NFR BLD AUTO: 13 % (ref 4–13)
NEUTROPHILS # BLD AUTO: 3.48 K/MM3 (ref 1.96–9.15)
NEUTROPHILS NFR BLD AUTO: 76 % (ref 41–73)
NRBC # BLD AUTO: 0 K/MM3 (ref 0–0.02)
NRBC BLD AUTO-RTO: 0 /100 WBC (ref 0–0.2)
PLATELET # BLD AUTO: 98 K/MM3 (ref 150–400)
POTASSIUM SERPL-SCNC: 4 MMOL/L (ref 3.5–5.5)
PROT SERPL-MCNC: 4.8 G/DL (ref 6.4–8.2)
SODIUM SERPL-SCNC: 143 MMOL/L (ref 136–145)

## 2022-10-04 NOTE — NUR
SHIFT SUMMARY:
PT A/O X 3 TODAY, BEDREST DUE TO WEAKNESS. PLEASANT AND COOPERATIVE WITH CARE.
PT LACTULOSE HELD DUE TO HER HAVING 2 MED FORMED SOFT STOOLS. PT JAUNDICE
COLOR APPEARS TO BE IMPROVING. BILIARY DRAIN CONTINUES TO DRAIN GREEN/BROWN
LIQUID. DRESSING TO INSERTION SITE RUQABD WAS CHANGED TODAY PER MD ORDER. NO
REDNESS OR SWELLING NOTED AT SITE. PT REPORTED TOLERABLE PAIN THROUGHOUT THE
DAY. WAS TENDER TO TOUCH WITH CLEANSING AND DRESSING CHANGE BUT PT TOLERATED
WELL. PT EATING BETTER AT THE END OF THE DAY. LANTUS HELD LAST NIGHT AND THIS
MORNING DUE TO LOW BS LEVELS AND POOR APPETITE BUT APPETITE IMPROVED AND
DINNER TIME CBG  AND PT GIVEN HER NOVOLOG 2 UNITS. PT HAS MINIMAL EDEMA
TO BLE.

## 2022-10-04 NOTE — NUR
Rn summary:  Patient is more alert this evening, speech is no longer delayed
like yesterday. Speech did get more delayed in the middle of the night as
patient was sleepier.  Pt has been incontinent of urine, attends are in place.
Skin is very jaundiced. Pt was quite painful around billiary drain at the
beginning of shft.  Pt was medicated with good relief and has had no further
c/o pain.  Pt had also c/o some difficulty breathing, painful with
inspiration.  Lung with good air movement, no crepitous felt at drain site.
Call light in reach, bed alarm on but pt does not try to get out of bed.

## 2022-10-05 LAB
ALBUMIN SERPL BCP-MCNC: 2 G/DL (ref 3.4–5)
ALBUMIN/GLOB SERPL: 0.7 {RATIO} (ref 0.8–1.8)
ALT SERPL W P-5'-P-CCNC: 38 U/L (ref 12–78)
ANION GAP SERPL CALCULATED.4IONS-SCNC: 7 MMOL/L (ref 6–16)
AST SERPL W P-5'-P-CCNC: 43 U/L (ref 12–37)
BASOPHILS # BLD AUTO: 0.05 K/MM3 (ref 0–0.23)
BASOPHILS NFR BLD AUTO: 1 % (ref 0–2)
BILIRUB SERPL-MCNC: 15 MG/DL (ref 0.1–1)
BUN SERPL-MCNC: 22 MG/DL (ref 8–24)
CALCIUM SERPL-MCNC: 8.3 MG/DL (ref 8.5–10.1)
CHLORIDE SERPL-SCNC: 112 MMOL/L (ref 98–108)
CO2 SERPL-SCNC: 23 MMOL/L (ref 21–32)
CREAT SERPL-MCNC: 0.46 MG/DL (ref 0.4–1)
DEPRECATED RDW RBC AUTO: 103.6 FL (ref 35.1–46.3)
EOSINOPHIL # BLD AUTO: 0.17 K/MM3 (ref 0–0.68)
EOSINOPHIL NFR BLD AUTO: 2 % (ref 0–6)
ERYTHROCYTE [DISTWIDTH] IN BLOOD BY AUTOMATED COUNT: 28.3 % (ref 11.7–14.2)
GLOBULIN SER CALC-MCNC: 2.9 G/DL (ref 2.2–4)
GLUCOSE SERPL-MCNC: 227 MG/DL (ref 70–99)
HCT VFR BLD AUTO: 26.3 % (ref 33–51)
HGB BLD-MCNC: 9.4 G/DL (ref 11.5–16)
IMM GRANULOCYTES # BLD AUTO: 0.08 K/MM3 (ref 0–0.1)
IMM GRANULOCYTES NFR BLD AUTO: 1 % (ref 0–1)
LYMPHOCYTES # BLD AUTO: 0.54 K/MM3 (ref 0.84–5.2)
LYMPHOCYTES NFR BLD AUTO: 7 % (ref 21–46)
MCHC RBC AUTO-ENTMCNC: 35.7 G/DL (ref 31.5–36.5)
MCV RBC AUTO: 103 FL (ref 80–100)
MONOCYTES # BLD AUTO: 1.2 K/MM3 (ref 0.16–1.47)
MONOCYTES NFR BLD AUTO: 15 % (ref 4–13)
NEUTROPHILS # BLD AUTO: 6.24 K/MM3 (ref 1.96–9.15)
NEUTROPHILS NFR BLD AUTO: 75 % (ref 41–73)
NRBC # BLD AUTO: 0 K/MM3 (ref 0–0.02)
NRBC BLD AUTO-RTO: 0 /100 WBC (ref 0–0.2)
PLATELET # BLD AUTO: 84 K/MM3 (ref 150–400)
POTASSIUM SERPL-SCNC: 4.3 MMOL/L (ref 3.5–5.5)
PROT SERPL-MCNC: 4.9 G/DL (ref 6.4–8.2)
SODIUM SERPL-SCNC: 142 MMOL/L (ref 136–145)

## 2022-10-05 NOTE — NUR
SHIFT SUMMARY:  PT IS ALERT WITH NOTABLE CONFUSION, HAS A DIFFICULT TIME
EXPRESSING WORDS FOR NEEDS.  PT IS A MAX ASSIST, NOT OUT OF BED OVERNIGHT,
USING THE BED PAN.  PT REPORTS PAIN ON ONE OCCASION, GAVE PRN OXYCODONE.  PT'S
BILIARY DRAIN PATENT AND DRAINING DARK RUST COLORED FLUID.  PT DENIES NAUSEA,
VOMITING, AND SOB.  PT SLEPT MUCH OF THE NIGHT WHEN NOT DISTURBED.  BED IN LOW
POSITION, CALL LIGHT WITHIN REACH.  WILL CONTINUE TO MONITOR.

## 2022-10-05 NOTE — NUR
Spiritual care visit conducted. Pt is lying in bed and alert. Pt's spouse,
Librado, is bedside and is tearful. Librado tells me that he believes the pt's
oncologist said that the pt only has a few weeks to live. Librado continues to
express his fears, and discusses his current concern about whether or not pt
will have a procedure today. He explains that pt will most likely go home with
hospice care either way. Pt is also tearful as she shares about her worries,
and the frustrations of her body "falling a part on her." We talk about
sources of meaning and value, about the beauty that can be experienced in
seasons like this and how much inner strength pt has displayed in her weeks in
our facility. I normalize their experience, reinforce healthy ways to frame
this season, and provide therapeutic listening, anticipatory grief support,
gentle  and prayer.  Pt is moved by the prayer and they both voice
appreciation for the kindness and prayer offered. I will continue to remain
available to pt and family.

## 2022-10-05 NOTE — NUR
SHIFT SUMMARY:
PT A/O X 3, ON BEDREST DUE TO WEAKNESS. PT APPEARED TO BE WEAKER TODAY. SHE
HAD DIFFICULTY  WITH WORD FINDING AT DINNER TIME AND BECAME FRUSTRATED AND
APPEARED ANXIOUS. PT OFFERED ATARAX FOR HER ANXIETY AND SHE AGREED TO TAKE. PT
DID NOT EAT DINNER TONIGHT BUT DID EAT BREAKFAST AND LUNCH. SPOUSE AND PT HAVE
CHOSEN  HOSPICE CARE ONCE DISCHARGED HOME. PALLIATIVE CARE AWARE AND WORKING
ON A PLAN. PT REPORTED PAIN TOLERABLE THROUGHOUT THE DAY. BILIARY DRAINING
DARK GREEN LIQUID. SITE CDI, NO SIGNS OF INFECTION.

## 2022-10-06 NOTE — NUR
SHIFT SUMMARY:
 
PT CONFUSED, ANXIOUS AND DIFFICULTY VOICING HER NEEDS. PT WAS ABLE TO TAKE HER
MEDICATIONS, DIFFICULTY SWALLOWING BUT ABLE TO TAKE HER MEDICATIONS. PT
DRAINAGE BAG HAD LITTLE GREEN AND REDDISH BROWN DRAINAGE. PT SEEN BY
PALLIATIVE CARE AND NEW ORDERS FOR COMFORT CARE PLACED. CATHETER PLACE PER
ORDERS. PT SHOWS SIGNS OF DISCOMFORT AND ANXIOUSNESS, PT MEDICATION WITH
MORPHINE 5MG. ON REASSESSMENT PT RESTING QUIETLY, NO DYSPNEA OR AGITATION. PT
RESTING IN BED WITH SOFT TOUCH CALL LIGHT IN REACH.

## 2022-10-06 NOTE — NUR
Received a phone call from pt's daughter Jeri today, and she was very
tearful throughout the conversation. She states she is unable to help with
the situation, and won't be coming up from her home in [place] at any point
over the next few weeks. She indicated her dad has very recently told her that
she and her help aren't needed. She also states concern for pt and her dad to
return home without CG's.
  Also received call from pt's son today after daughter. He also states today
that he has changed his mind, won't be able to help with any assistance.
  Information passed onto Kely DIEHL Medical Social worker.
  Pt will likely be stating here until alternate  placement found.

## 2022-10-06 NOTE — NUR
Pt and her  received the news yesterday that pt has approximately 2
weeks to live, and they have decided to return home with hospice.
Unfortunately, there are concerns that the pt's  is unable to care for
pt at home without caregivers assistance. I spoke to pt's son and daughter
yesterday, and they have agreed to attempt to help with hiring caregivers.
Attempted to reach out to both son and daughter this am, and did speak to
daughter Jeri. At this point, she has not decided if she will be able to
assist with caregiving her own self, reports a strained relationship between
her father and both children. However, she also states she doesn't want to
leave pt "stranded without adequate help".
  No commitment thus far from son or daughter.  Will report to care manager.

## 2022-10-06 NOTE — NUR
PATIENT VERY QUIET OVERNIGHT.SLEPT VERY LIGHTLY WAKING EACH TIME STAFF WOULD
STEP IN TO CHECK ON HER.  TOLERATING REPOSITIONING WELL.  ATE NO DINNER OR
SNACKS.  FREQUENT SIPS OF WATER. JUICE AND CRAN APPLE JUICE. HELD HS LANTUS
DUE TO PATIENT'S HX OF DRASTICALLY DROPPING HER SUGARS IN THE MORNING.
PATIENT LOOKING FORWARD TO HER MEETING WITH HOSPICE LATER TODAY.

## 2022-10-07 NOTE — NUR
NIGHT SHIFT SUMMARY
DONNA IS MUCH MORE SOMNOLENT TONIGHT.  SHE DID HAVE ONE MOMENT WHERE SHE KEPT
MOVING HER ARMS OVER HER STOMACH AND THEN TO HER HEAD.  IT APPEARED SHE MAY BE
PAINFUL, SO SHE RECEIVED ONE DOSE OF 5MG ROXYNOL.  VOIDING BROWNISH SHAREE
URINE IN SMALL QUANTITY.  BILIARY BAG ALSO HAD VERY LITTLE OUT.  IT DID SHOW A
FEW CLOTS IN A MORE BILE COLORED LIQUID OVERNIGHT, BUT AGAIN, VERY LITTLE OUT.
SO INDICATION OF PAIN EVEN  WITH GENTLE REPOSITIONING.

## 2022-10-07 NOTE — NUR
Shift Summary
Lethargic, sleeping most of the day. Patient requested x 1 roxanol. Turn for
comfort. Drain site cleansed with mild soap and water, new tegaderm placed.

## 2022-10-08 NOTE — NUR
PATIENT MEDICATED X2 WITH 20MG ROXINOL, PAINFUL WITH REPOSITIONING. PATIENT
TOOKS SMALL SIPS OF WATER AND ENSURE, BUT OTHERWISE WAS TOO DRWSY FOR MEALS.
 AT BEDSIDE THIS AM AND AGAIN THIS AFTERNOON. COMFORT CART ORDERED.
BRYANT TO GRAVITY AND DRAINING SMALL AMOUNT OF TEA COLORED URINE. FALL
PRECAUTIONS IN PLACE. TURNING Q2 HOURS. 2+EDEMA TO BLE. BILIARY DRAIN IN PLACE
WITH MINIMAL OUTPUT. CONTINUES TO OPEN EYES TO VERBAL STIMULUS, BUT VERY
LITTLE VERBAL RESPONSE.

## 2022-10-08 NOTE — NUR
PATIENT WAS SLIGHTLY MORE AWAKE  OVERNIGHT. SHE DRANK WATER AND COLD
APPLEJUICE, AND RECEIVED TWO DOSES OF 20MG ROXYNOL.  SHE SEEMED TO BE
COMFORTED BY LOTION RUBBED ON HER ARMS AND HANDS.  IT WOULD HELP HER BACK TO
SLEEP.

## 2022-10-09 NOTE — NUR
Comfort Care Visit
 
Pt resting in bed with her eyes closed. Pt appears comfortable with no S/S of
distress at this time. No family at bedside at this time. Pt left undisturbed
at this time.
 
Spoke with Primary RN Suzi and discussed case.
 
Palliative Care will remain available.

## 2022-10-09 NOTE — NUR
AT BEDSIDE. PATIENT APPEARS COMFORTABLE, NO S/S OF DISTRESS. CARE CART
REFILLED.  DENIES ANY NEEDS AT THIS TIME AND FEELS PATIENT IS
COMFORTABLE.

## 2022-10-09 NOTE — NUR
NIGHTSHIFT SUMMARY
Patient slept comfortably all night, pain apparent when respositioning
Medicated with Morphine & Ativan for pain/SOB, PRNs effective.
Minimal output from rojas catheter & biliary drain this shift. Will continue
to monitor.

## 2022-10-09 NOTE — NUR
SHIFT SUMMARY
 
PT HAS BEEN SLEEPING PEACEFULLY ALL SHIFT.  SHE WAS MEDICATED WITH PAIN
MEDICAITON THIS MORNING AND NO COMPLAINTS SINCE.  HER  HAS BEEN AT THE
BS TODAY.  RESPIRATIONS HAVE BEEN DECLINING BUT STILL WNL.  WILL CONTINUE TO
MONITOR AND ASSESS, WILL REPORT TO THE NIGHT NURSE.